# Patient Record
Sex: FEMALE | Race: WHITE | Employment: FULL TIME | URBAN - METROPOLITAN AREA
[De-identification: names, ages, dates, MRNs, and addresses within clinical notes are randomized per-mention and may not be internally consistent; named-entity substitution may affect disease eponyms.]

---

## 2017-08-11 ENCOUNTER — HOSPITAL ENCOUNTER (OUTPATIENT)
Dept: MAMMOGRAPHY | Age: 42
Discharge: HOME | End: 2017-08-11
Attending: OBSTETRICS & GYNECOLOGY
Payer: COMMERCIAL

## 2017-08-11 PROCEDURE — 77067 SCR MAMMO BI INCL CAD: CPT

## 2017-09-05 PROBLEM — I10 ESSENTIAL HYPERTENSION: Status: ACTIVE | Noted: 2017-09-05

## 2017-09-05 PROBLEM — K21.9 GASTROESOPHAGEAL REFLUX DISEASE WITHOUT ESOPHAGITIS: Status: ACTIVE | Noted: 2017-09-05

## 2017-09-05 PROBLEM — D50.9 IRON DEFICIENCY ANEMIA: Status: ACTIVE | Noted: 2017-09-05

## 2017-09-05 PROBLEM — E78.00 PURE HYPERCHOLESTEROLEMIA: Status: ACTIVE | Noted: 2017-09-05

## 2018-03-06 PROBLEM — E66.01 OBESITY, MORBID (HCC): Status: ACTIVE | Noted: 2018-03-06

## 2018-06-15 ENCOUNTER — HOSPITAL ENCOUNTER (EMERGENCY)
Age: 43
Discharge: HOME | End: 2018-06-15
Attending: EMERGENCY MEDICINE
Payer: COMMERCIAL

## 2018-06-15 ENCOUNTER — APPOINTMENT (OUTPATIENT)
Dept: GENERAL RADIOLOGY | Age: 43
End: 2018-06-15
Attending: EMERGENCY MEDICINE
Payer: COMMERCIAL

## 2018-06-15 VITALS
HEART RATE: 66 BPM | HEIGHT: 64 IN | BODY MASS INDEX: 45.24 KG/M2 | WEIGHT: 265 LBS | TEMPERATURE: 98.1 F | OXYGEN SATURATION: 98 % | RESPIRATION RATE: 18 BRPM | SYSTOLIC BLOOD PRESSURE: 145 MMHG | DIASTOLIC BLOOD PRESSURE: 92 MMHG

## 2018-06-15 DIAGNOSIS — M89.8X1 PERISCAPULAR PAIN: Primary | ICD-10-CM

## 2018-06-15 PROCEDURE — 96372 THER/PROPH/DIAG INJ SC/IM: CPT | Performed by: EMERGENCY MEDICINE

## 2018-06-15 PROCEDURE — 74011250637 HC RX REV CODE- 250/637: Performed by: EMERGENCY MEDICINE

## 2018-06-15 PROCEDURE — 74011250636 HC RX REV CODE- 250/636: Performed by: EMERGENCY MEDICINE

## 2018-06-15 PROCEDURE — 71046 X-RAY EXAM CHEST 2 VIEWS: CPT

## 2018-06-15 PROCEDURE — 99283 EMERGENCY DEPT VISIT LOW MDM: CPT | Performed by: EMERGENCY MEDICINE

## 2018-06-15 RX ORDER — KETOROLAC TROMETHAMINE 30 MG/ML
60 INJECTION, SOLUTION INTRAMUSCULAR; INTRAVENOUS
Status: COMPLETED | OUTPATIENT
Start: 2018-06-15 | End: 2018-06-15

## 2018-06-15 RX ORDER — HYDROCODONE BITARTRATE AND ACETAMINOPHEN 7.5; 325 MG/1; MG/1
1 TABLET ORAL
Status: COMPLETED | OUTPATIENT
Start: 2018-06-15 | End: 2018-06-15

## 2018-06-15 RX ORDER — METHOCARBAMOL 500 MG/1
500 TABLET, FILM COATED ORAL 3 TIMES DAILY
Qty: 21 TAB | Refills: 0 | Status: SHIPPED | OUTPATIENT
Start: 2018-06-15 | End: 2018-06-22

## 2018-06-15 RX ORDER — NAPROXEN 500 MG/1
500 TABLET ORAL 2 TIMES DAILY WITH MEALS
Qty: 20 TAB | Refills: 0 | Status: SHIPPED | OUTPATIENT
Start: 2018-06-15 | End: 2018-06-25

## 2018-06-15 RX ADMIN — KETOROLAC TROMETHAMINE 60 MG: 30 INJECTION, SOLUTION INTRAMUSCULAR at 09:33

## 2018-06-15 RX ADMIN — HYDROCODONE BITARTRATE AND ACETAMINOPHEN 1 TABLET: 7.5; 325 TABLET ORAL at 11:47

## 2018-06-15 NOTE — LETTER
[facility] [facility] EMERGENCY DEPT One [address] [address] [phone] Work/School Note Date: 6/15/2018 To Whom It May concern: 
 
Jeferson Jeaneths was seen and treated today in the emergency room by the following provider(s): 
Attending Provider: Linda Phillips MD.   
 
Jeferson Wilson Special Instructions:  work excuse today Sincerely, 
 
 
 
 
Linda Phillips MD

## 2018-06-15 NOTE — ED NOTES
I have reviewed discharge instructions with the patient. The patient verbalized understanding. Patient left ED via Discharge Method: ambulatory to Home with mother. Opportunity for questions and clarification provided. Patient given 2 scripts. To continue your aftercare when you leave the hospital, you may receive an automated call from our care team to check in on how you are doing. This is a free service and part of our promise to provide the best care and service to meet your aftercare needs.  If you have questions, or wish to unsubscribe from this service please call [phone]. Thank you for Choosing our [employer] Emergency Department.

## 2018-06-15 NOTE — ED PROVIDER NOTES
HPI Comments: Patient was fine yesterday, woke up this morning, had some pain to the left medial scapular region. She reached up with her right arm to get something out of a cabinet and the pain became much more acute. No history of recent injury or similar problem in the past.  Denies any shortness of breath. No pleuritic pain. No redness or rash to area of discomfort. Has taken no medications. The history is provided by the patient. Past Medical History:   Diagnosis Date    Asthma     use inhaler    GERD (gastroesophageal reflux disease)     Hypertension, essential        No past surgical history on file. Family History:   Problem Relation Age of Onset    Diabetes Mother      DM 2    Hypertension Mother     Depression Mother     Breast Cancer Neg Hx        Social History     Social History    Marital status:      Spouse name: N/A    Number of children: N/A    Years of education: N/A     Occupational History    Not on file. Social History Main Topics    Smoking status: Never Smoker    Smokeless tobacco: Never Used    Alcohol use No    Drug use: Not on file    Sexual activity: Not on file     Other Topics Concern    Not on file     Social History Narrative         ALLERGIES: Review of patient's allergies indicates no known allergies. Review of Systems   Constitutional: Negative for chills and fever. Respiratory: Negative. Negative for shortness of breath, wheezing and stridor. Genitourinary: Negative. Skin: Negative for color change and rash. Neurological: Negative. All other systems reviewed and are negative. Vitals:    06/15/18 0849   BP: (!) 153/97   Pulse: 81   Resp: 16   Temp: 98.1 °F (36.7 °C)   SpO2: 98%   Weight: 120.2 kg (265 lb)   Height: 5' 4\" (1.626 m)            Physical Exam   Constitutional: She appears well-developed and well-nourished. No distress. HENT:   Head: Atraumatic. Eyes: No scleral icterus. Neck: Neck supple. Cardiovascular: Normal rate, regular rhythm and intact distal pulses. No murmur heard. Pulmonary/Chest: Effort normal. No respiratory distress. She exhibits tenderness. Tender to area medial to the left scapula and somewhat less to similar area on the right. This makes pain. The patient original presentation worse to palpation   Abdominal: Soft. There is no tenderness. Musculoskeletal: Normal range of motion. Neurological: She is alert. Skin: Skin is warm and dry. Psychiatric: Thought content normal.   Nursing note and vitals reviewed. MDM  Number of Diagnoses or Management Options  Periscapular pain:   Diagnosis management comments: Here with chest pain worse certain movements and also to certain movements of each arm. No area of redness or rash. No initial provoking injury. Pain is actually significantly less on arrival than what patient had at home. No fever, cough or sputum. No history of pneumothorax. No unusual lifting or events that might be provoking of this. No history of DVT, pulmonary emboli or recent trips, travel or new risk factors       Amount and/or Complexity of Data Reviewed  Tests in the radiology section of CPT®: reviewed and ordered  Independent visualization of images, tracings, or specimens: yes    Risk of Complications, Morbidity, and/or Mortality  Presenting problems: moderate  Diagnostic procedures: low  Management options: moderate    Patient Progress  Patient progress: stable        ED Course       Procedures         Study Result      Chest 2 views dated 6/15/2018     CLINICAL INFORMATION: Left shoulder and back pain this morning     No comparison     Heart is normal in size. Mediastinum is unremarkable. Pulmonary vascularity  appears normal. Lungs clear. No pleural effusion or pneumothorax.  No acute bony  [place] seen on these 2 films.     IMPRESSION  IMPRESSION: No acute abnormality

## 2018-06-15 NOTE — DISCHARGE INSTRUCTIONS

## 2018-08-13 ENCOUNTER — HOSPITAL ENCOUNTER (OUTPATIENT)
Dept: MAMMOGRAPHY | Age: 43
Discharge: HOME | End: 2018-08-13
Attending: OBSTETRICS & GYNECOLOGY
Payer: COMMERCIAL

## 2018-08-13 DIAGNOSIS — Z12.39 SCREENING BREAST EXAMINATION: ICD-10-CM

## 2018-08-13 PROCEDURE — 77067 SCR MAMMO BI INCL CAD: CPT

## 2019-08-23 ENCOUNTER — HOSPITAL ENCOUNTER (OUTPATIENT)
Dept: MAMMOGRAPHY | Age: 44
Discharge: HOME | End: 2019-08-23
Attending: OBSTETRICS & GYNECOLOGY

## 2019-08-23 DIAGNOSIS — Z12.31 VISIT FOR SCREENING MAMMOGRAM: ICD-10-CM

## 2020-08-24 ENCOUNTER — HOSPITAL ENCOUNTER (OUTPATIENT)
Dept: MAMMOGRAPHY | Age: 45
Discharge: HOME | End: 2020-08-24
Attending: OBSTETRICS & GYNECOLOGY

## 2020-08-24 DIAGNOSIS — Z12.31 SCREENING MAMMOGRAM, ENCOUNTER FOR: ICD-10-CM

## 2021-05-19 PROBLEM — N90.4 LICHEN SCLEROSUS ET ATROPHICUS OF THE VULVA: Status: ACTIVE | Noted: 2019-03-14

## 2021-05-20 PROBLEM — E66.01 OBESITY, MORBID (HCC): Status: RESOLVED | Noted: 2018-03-06 | Resolved: 2021-05-20

## 2021-05-24 ENCOUNTER — HOSPITAL ENCOUNTER (OUTPATIENT)
Dept: ULTRASOUND IMAGING | Age: 46
Discharge: HOME | End: 2021-05-24
Attending: NURSE PRACTITIONER

## 2021-05-24 DIAGNOSIS — D17.1 LIPOMA OF ABDOMINAL WALL: ICD-10-CM

## 2021-07-29 ENCOUNTER — TRANSCRIBE ORDER (OUTPATIENT)
Dept: SCHEDULING | Age: 46
End: 2021-07-29

## 2021-07-29 DIAGNOSIS — Z12.31 VISIT FOR SCREENING MAMMOGRAM: Primary | ICD-10-CM

## 2021-08-26 ENCOUNTER — HOSPITAL ENCOUNTER (OUTPATIENT)
Dept: MAMMOGRAPHY | Age: 46
Discharge: HOME | End: 2021-08-26
Attending: NURSE PRACTITIONER

## 2021-08-26 DIAGNOSIS — Z12.31 VISIT FOR SCREENING MAMMOGRAM: ICD-10-CM

## 2022-02-08 ENCOUNTER — HOSPITAL ENCOUNTER (OUTPATIENT)
Dept: GENERAL RADIOLOGY | Age: 47
Discharge: HOME | End: 2022-02-08
Attending: NURSE PRACTITIONER
Payer: COMMERCIAL

## 2022-02-08 DIAGNOSIS — M25.562 CHRONIC PAIN OF LEFT KNEE: ICD-10-CM

## 2022-02-08 DIAGNOSIS — M25.561 CHRONIC PAIN OF RIGHT KNEE: ICD-10-CM

## 2022-02-08 DIAGNOSIS — G89.29 CHRONIC PAIN OF LEFT KNEE: ICD-10-CM

## 2022-02-08 DIAGNOSIS — G89.29 CHRONIC PAIN OF RIGHT KNEE: ICD-10-CM

## 2022-02-08 PROCEDURE — 73562 X-RAY EXAM OF KNEE 3: CPT

## 2022-02-17 ENCOUNTER — HOSPITAL ENCOUNTER (OUTPATIENT)
Dept: PHYSICAL THERAPY | Age: 47
Discharge: HOME | End: 2022-02-17
Payer: COMMERCIAL

## 2022-02-17 DIAGNOSIS — G89.29 CHRONIC PAIN OF RIGHT KNEE: ICD-10-CM

## 2022-02-17 DIAGNOSIS — M25.561 CHRONIC PAIN OF RIGHT KNEE: ICD-10-CM

## 2022-02-17 DIAGNOSIS — M25.562 CHRONIC PAIN OF LEFT KNEE: ICD-10-CM

## 2022-02-17 DIAGNOSIS — G89.29 CHRONIC PAIN OF LEFT KNEE: ICD-10-CM

## 2022-02-17 PROCEDURE — 97161 PT EVAL LOW COMPLEX 20 MIN: CPT

## 2022-02-17 NOTE — THERAPY EVALUATION
Jayce Bras  : 1975  Payor: Anna Bowser / Plan: [employer] / Product Type: PPO /    [address]  at [facility]  [address], [address], [place] [address]  [phone]   [phone]         OUTPATIENT PHYSICAL THERAPY:Initial Assessment 2022    ICD-10: Treatment Diagnosis:   Pain in left knee (M25.562)   Pain in right knee (M25.561)     Precautions/Allergies:   Patient has no known allergies. TREATMENT PLAN:  Effective Dates: 2022 TO 2022 (60 days). Frequency/Duration: 1-2 time a week for 60 Day(s) MEDICAL/REFERRING DIAGNOSIS:  Chronic pain of right knee [M25.561, G89.29]  Chronic pain of left knee [M25.562, G89.29]   DATE OF ONSET: Dec 2021  REFERRING PHYSICIAN: Tobias Sims NP MD Orders: PT eval and tx  Return MD Appointment: 3/2022     INITIAL ASSESSMENT:  Ms. Kana Khoury presents to PT eval w/ c/o B knee pain w/ L knee more painful than the R. She is unsure how it all began, but her X-rays were negative for fracture or degenerative changes. With meniscal and ligamentous testing, she had no reproduction of symptoms or laxity. Ms. Kana Khoury did however have gross weakness in BLEs and decreased balance in single leg stance. She will benefit from continued skilled PT services to improve her deficits listed below and to progress towards her PLOF. Thank you for this referral.    PROBLEM LIST (Impacting functional limitations):  1. Decreased Strength  2. Decreased ADL/Functional Activities  3. Decreased Ambulation Ability/Technique  4. Decreased Balance  5. Increased Pain  6. Decreased Activity Tolerance  7. Decreased [place] with Home Exercise Program INTERVENTIONS PLANNED:  1. Balance Exercise  2. Electrical Stimulation  3. Gait Training  4. Home Exercise Program (HEP)  5. Manual Therapy  6. Therapeutic Activites  7.  Therapeutic Exercise/Strengthening   GOALS: (Goals have been discussed and agreed upon with patient.)  Short-Term Functional Goals: Time Frame: 4 weeks  1. Pt will be independent w/ HEP in order to improve outcomes and decrease pain. 2. Pt will have LEFS score of 47 or greater displaying decreased functional impairments and pain levels. 3. Pt will perform >5 continuous minutes of standing dynamic balance w/o LOB or use of UE assist.   Discharge Goals: Time Frame: 5 weeks  1. Pt will have LEFS score of 52 or greater displaying decreased functional impairments and pain levels. 2. Pt will be able to ascend/descend 13 steps w/ a reciprocal gait pattern w/o LOB. 3. Pt will have 4+/5 or greater B LE strength in order to safely perform daily functional activities. 4. Pt will walk 10 minutes w/ pain of 1/10 or less in order to return to prior activities. Outcome Measure: Tool Used: Lower Extremity Functional Scale (LEFS)  Score:  Initial: 42/80 Most Recent: X/80 (Date: -- )   Interpretation of Score: 20 questions each scored on a 5 point scale with 0 representing \"extreme difficulty or unable to perform\" and 4 representing \"no difficulty\". The lower the score, the greater the functional disability. 80/80 represents no disability. Minimal detectable change is 9 points. Medical Necessity:   · Patient is expected to demonstrate progress in strength, range of motion, balance and coordination to increase independence with functional tasks. Reason for Services/Other Comments:  · Patient continues to demonstrate capacity to improve strength, ROM, balance, mobility which will increase independence. Total Duration: 30 min eval, 15 mins therex  PT Patient Time In/Time Out  Time In: 1030  Time Out: 1115    Rehabilitation Potential For Stated Goals: Good  Regarding Amy Trinh's therapy, I certify that the treatment plan above will be carried out by a therapist or under their direction.   Thank you for this referral,  Angella Bains, PT     Referring Physician Signature: Guerrero Ramos NP             The information in this section was collected on 2/17/22 (except where otherwise noted). HISTORY:   History of Present Injury/Illness (Reason for Referral): Unsure how pain began, stopped exercising before [place] because of her knee pain, lost 73 pounds over the past year on purpose, has trouble with stairs and getting up out of chair, L hurts worse than R  Past Medical History/Comorbidities:   Ms. Kana Khoury  has a past medical history of Asthma, GERD (gastroesophageal reflux disease), and Hypertension, essential.  Ms. Kana Khoury  has no past surgical history on file. anxiety,high cholesterol, seizures  Social History/Living Environment:     Lives w/ family in house w/ 2 steps  Prior Level of Function/Work/Activity:  Independent, works full time as   Dominant Side:         RIGHT      Ambulatory/Rehab Services H2 Model Falls Risk Assessment 2/17/22    Risk Factors:       No Risk Factors Identified Ability to Rise from Chair:       (0)  Ability to rise in a single movement    Falls Prevention Plan:       No modifications necessary   Total: (5 or greater = High Risk): 0    ©2010 [facility] [address] [place] [address] States Patent #[id number]. Federal Law prohibits the replication, distribution or use without written permission from [facility]     Current Medications:      Current Outpatient Medications:     meloxicam (MOBIC) 7.5 mg tablet, Take 1 Tablet by mouth daily as needed for Pain. With food, Disp: 30 Tablet, Rfl: 0    senna-docusate (PERICOLACE) 8.6-50 mg per tablet, Take 2 Tablets by mouth nightly. For constipation, Disp: 180 Tablet, Rfl: 3    Lactobacillus Acidoph & Bulgar (Floranex) 1 million cell tab tablet, Take 2 Tablets by mouth daily (after breakfast).  (probiotic supplement), Disp: 60 Tablet, Rfl: 2    SUMAtriptan (IMITREX) 50 mg tablet, Take 1/2 to 1 tablet orally once daily as needed for headache, Disp: 10 Tablet, Rfl: 1    busPIRone (BUSPAR) 5 mg tablet, TAKE 1 TABLET BY MOUTH TWICE A DAY Indications: repeated episodes of anxiety, Disp: 180 Tablet, Rfl: 3    metoprolol tartrate (LOPRESSOR) 50 mg tablet, TAKE 1 TABLET BY MOUTH EVERY DAY  Indications: high blood pressure, Disp: 90 Tablet, Rfl: 3    pantoprazole (PROTONIX) 40 mg tablet, TAKE 1 TABLET BY MOUTH EVERY DAY  Indications: gastroesophageal reflux disease, Disp: 90 Tablet, Rfl: 3    ProAir HFA 90 mcg/actuation inhaler, USE 2 PUFFS EVERY 4 TO 6 HOURS AS NEEDED FOR SHORTNESS OF BREATH/WHEEZING/COUGH  Indications: asthma attack, Disp: 1 Inhaler, Rfl: 11    atorvastatin (LIPITOR) 40 mg tablet, Take 1 Tablet by mouth daily. , Disp: 90 Tablet, Rfl: 3    clobetasoL (TEMOVATE) 0.05 % ointment, Use up to 2 times a day with a flair or at least 2 times a month to decrease inflammation, Disp: , Rfl:     Nortrel 7/7/7, 28, 0.5/0.75/1 mg- 35 mcg tab, TAKE 1 TABLET BY MOUTH EVERY DAY, Disp: 1 Package, Rfl: 11    cholecalciferol (VITAMIN D3) 1,000 unit cap, Take  by mouth daily. , Disp: , Rfl:     omega 3-dha-epa-fish oil (FISH OIL) 100-160-1,000 mg cap, Take  by mouth., Disp: , Rfl:    Date Last Reviewed:  2/17/2022    Number of Personal Factors/Comorbidities that affect the Plan of Care: 1-2: MODERATE COMPLEXITY   EXAMINATION:   Observation/Orthostatic Postural Assessment: Forward head, rounded shoulders  Palpation:          Tender to medial patella on L  Special Tests:           Kulwinder's - negative        MCL/LCL laxity - negative        ACL/PCL - negative   Functional Mobility:         Gait/Ambulation:  Independent, antalgic        Transfers:  Independent        Bed Mobility:  Independent        Stairs:  Step to w/ rails  Balance:          Decreased in SLS w/ L worse than R  Sensation:         Intact w/ light touch to BLEs    Joint/Muscle ROM Strength Updates   Hip flexion WFL 4-/5 B    Hip abduction WFL 4-/5 B    Hip extension WFL 4-/5 B    Knee flexion 145* B 4-/5 B    Knee extension 0* B 4-/5 B    DF WFL 5/5 B         Body Structures Involved:  8. Nerves  9. Bones  10. Joints  11. Muscles  12. Ligaments Body Functions Affected:  8. Mental  9. Sensory/Pain  10. Neuromusculoskeletal  11. Movement Related Activities and Participation Affected:  1. General Tasks and Demands  2. Mobility  3. Self Care  4. Domestic Life  5. Interpersonal Interactions and Relationships  6. Community, Social and [place]   Number of elements (examined above) that affect the Plan of Care: 3: MODERATE COMPLEXITY   CLINICAL PRESENTATION:   Presentation: Stable and uncomplicated: LOW COMPLEXITY   CLINICAL DECISION MAKING:      Use of outcome tool(s) and clinical judgement create a POC that gives a: Clear prediction of patient's progress: LOW COMPLEXITY              Pain: Initial:   Pain Intensity 1: 4  Pain Location 1: Knee  Pain Orientation 1: Left,Right  Post Session:  1/10     [employer] Mobile Portal  Access Code: [email or web address]  URL: [email or web address]  Date: 02/17/2022  Prepared by: Alcides Braden    Exercises  Long Sitting Quad Set - 7 x weekly - 2 sets - 10 reps - 5 hold  Supine Quad Set - 3 x weekly - 2 sets - 10 reps - 5 hold  Supine Active Straight Leg Raise - 3 x weekly - 2 sets - 10 reps - 5 hold  Supine Bridge - 3 x weekly - 2 sets - 10 reps - 5 hold  Clamshell with Resistance - 3 x weekly - 2 sets - 10 reps - 5 hold  Mini Squat with Counter Support - 3 x weekly - 2 sets - 10 reps - 5 hold    Variance from POC: none    Alcides Braden PT

## 2022-02-21 ENCOUNTER — HOSPITAL ENCOUNTER (OUTPATIENT)
Dept: PHYSICAL THERAPY | Age: 47
Discharge: HOME | End: 2022-02-21
Payer: COMMERCIAL

## 2022-02-21 PROCEDURE — 97110 THERAPEUTIC EXERCISES: CPT

## 2022-02-21 NOTE — PROGRESS NOTES
Ning Ridley  : 1975  Primary: [facility]  Secondary:  [facility]rapy Center at [facility]  [facility] [address], [place] [address]  [phone]   [phone]      OUTPATIENT PHYSICAL THERAPY: Daily Treatment Note 2022  Visit Count:  2    ICD-10: Treatment Diagnosis:   Pain in left knee (M25.562)   Pain in right knee (M25.561)     Precautions/Allergies:   Patient has no known allergies. TREATMENT PLAN:  Effective Dates: 2022 TO 2022 (60 days). Frequency/Duration: 1-2 time a week for 60 Day(s) MEDICAL/REFERRING DIAGNOSIS:  Chronic pain of right knee [M25.561, G89.29]  Chronic pain of left knee [M25.562, G89.29]   DATE OF ONSET: Dec 2021  REFERRING PHYSICIAN: Jorge Luis Michaels NP MD Orders: PT eval and tx  Return MD Appointment: 3/2022     Pre-treatment Symptoms/Complaints:  Pain in B knees is better. Taped helped. Squats made her sore but other exercises were fine. Pain: Initial:Pain Intensity 1: 1  Pain Location 1: Knee  Pain Orientation 1: Left,Right Post Session:  0/10   Medications Last Reviewed:  2022  Updated Objective Findings:  None Today  TREATMENT:     THERAPEUTIC EXERCISE: (50 minutes):  Exercises per grid below to improve mobility, strength, balance and coordination. Required minimal verbal and tactile cues to promote proper body alignment, promote proper body posture and promote proper body mechanics. Progressed resistance, range, repetitions and complexity of movement as indicated.    Date:  22 Date:  22 Date:      Activity/Exercise Parameters Parameters Parameters    NuStep  10 mins, 2.0      Quad set 10x B      SAQ 10x B on half foam 20x B 2#     SLR 10x B 2x10 B 2#     Bridge 10x 2x10 w/ lime band     Clamshell 10x B lime band 2x10 lime band B     LAQ  2x10 2# B     Hamstring curl  Standing 2x10 2# B     Squat 10x at counter      KT tape To L knee for stability To B knees for stability     Side step up  10x B 6 inch step     Step up  10x B 6 inch step     Heel tap  10x B 4 inch step     Leg press  2x10 50# BLE  2x10 25# SL       [employer] Portal  Access Code: [email or web address]  URL: [email or web address]  Date: 02/17/2022  Prepared by: Jack Penny    Exercises  Long Sitting Quad Set - 7 x weekly - 2 sets - 10 reps - 5 hold  Supine Quad Set - 3 x weekly - 2 sets - 10 reps - 5 hold  Supine Active Straight Leg Raise - 3 x weekly - 2 sets - 10 reps - 5 hold  Supine Bridge - 3 x weekly - 2 sets - 10 reps - 5 hold  Clamshell with Resistance - 3 x weekly - 2 sets - 10 reps - 5 hold  Mini Squat with Counter Support - 3 x weekly - 2 sets - 10 reps - 5 hold    Treatment/Session Summary:    · Response to Treatment: Pt had no issues with therex. Her pain decreased w/ exercises. · Communication/Consultation:  None today  · Equipment provided today:  None today  · Recommendations/Intent for next treatment session: Next visit will focus on progression of functional tasks as tolerated.     Total Treatment Billable Duration:  50 minutes therex  PT Patient Time In/Time Out  Time In: 5221  Time Out: 127 Kellee Nguyen PT    Future Appointments   Date Time Provider Jorge Almaguer   2/23/2022  1:45 PM Seema Flores, PT [facility]   3/1/2022  8:30 AM Seema Flores, PT [facility]   3/3/2022  8:15 AM [facility] Flores, [facility]   3/8/2022  8:15 AM Seema Flores, PT [facility]   3/8/2022 11:00 AM Darshana Fuller NP [facility] PST PST   3/10/2022  8:15 AM [facility]   3/15/2022  8:15 AM Seema Flores, PT [facility]   3/17/2022  8:15 AM Seema Flores, PT [facility]   3/22/2022  8:15 AM Seema Flores, PT [facility]   3/24/2022  8:15 AM Seema Flores, PT [facility]   6/28/2022  8:50 AM PST LAB [facility] PST PST   7/6/2022  9:00 AM WERNER Arango PST PST

## 2022-02-23 ENCOUNTER — HOSPITAL ENCOUNTER (OUTPATIENT)
Dept: PHYSICAL THERAPY | Age: 47
Discharge: HOME | End: 2022-02-23
Payer: COMMERCIAL

## 2022-02-23 PROCEDURE — 97110 THERAPEUTIC EXERCISES: CPT

## 2022-02-23 NOTE — PROGRESS NOTES
Eliu Le  : 1975  Primary: [facility] [employer] [facility]*  Secondary:  [facility]rapy Center at [facility]  [facility] [address], [place] [address]  [phone]   [phone]      OUTPATIENT PHYSICAL THERAPY: Daily Treatment Note 2022  Visit Count:  3    ICD-10: Treatment Diagnosis:   Pain in left knee (M25.562)   Pain in right knee (M25.561)     Precautions/Allergies:   Patient has no known allergies. TREATMENT PLAN:  Effective Dates: 2022 TO 2022 (60 days). Frequency/Duration: 1-2 time a week for 60 Day(s) MEDICAL/REFERRING DIAGNOSIS:  Chronic pain of right knee [M25.561, G89.29]  Chronic pain of left knee [M25.562, G89.29]   DATE OF ONSET: Dec 2021  REFERRING PHYSICIAN: Mathew Fry NP MD Orders: PT eval and tx  Return MD Appointment: 3/2022     Pre-treatment Symptoms/Complaints:  Pain in B knees is better. A little sore due to rain. Pain: Initial:Pain Intensity 1: 2  Pain Location 1: Knee  Pain Orientation 1: Left,Right Post Session:  0/10   Medications Last Reviewed:  2022  Updated Objective Findings:  None Today  TREATMENT:     THERAPEUTIC EXERCISE: (45 minutes):  Exercises per grid below to improve mobility, strength, balance and coordination. Required minimal verbal and tactile cues to promote proper body alignment, promote proper body posture and promote proper body mechanics. Progressed resistance, range, repetitions and complexity of movement as indicated.    Date:  22 Date:  22 Date:  22    Activity/Exercise Parameters Parameters Parameters    NuStep  10 mins, 2.0  10 mins, 3.0    Quad set 10x B      SAQ 10x B on half foam 20x B 2# 20x B 2#     SLR 10x B 2x10 B 2# 2x10 2# B     Bridge 10x 2x10 w/ lime band 2x10 w/ lime band    Clamshell 10x B lime band 2x10 lime band B 2x10 lime band B    LAQ  2x10 2# B 2x10 2# B    Hamstring curl  Standing 2x10 2# B 20x B seated BTB    Squat 10x at counter      KT tape To L knee for stability To B knees for stability     Side step up  10x B 6 inch step 10x B 6 inch step    Step up  10x B 6 inch step 10x B 6 inch step    Heel tap  10x B 4 inch step 10x B 4 inch step    Leg press  2x10 50# BLE  2x10 25# SL 2x10 50# BLE  2x10 25# SL    TKE   20x B on wall      [employer] Portal  Access Code: [email or web address]  URL: [email or web address]  Date: 02/17/2022  Prepared by: [facility]    Exercises  Long Sitting Quad Set - 7 x weekly - 2 sets - 10 reps - 5 hold  Supine Quad Set - 3 x weekly - 2 sets - 10 reps - 5 hold  Supine Active Straight Leg Raise - 3 x weekly - 2 sets - 10 reps - 5 hold  Supine Bridge - 3 x weekly - 2 sets - 10 reps - 5 hold  Clamshell with Resistance - 3 x weekly - 2 sets - 10 reps - 5 hold  Mini Squat with Counter Support - 3 x weekly - 2 sets - 10 reps - 5 hold    Treatment/Session Summary:    · Response to Treatment: Pt again had reduced pain w/ therex. Added TKE this session w/o issue. · Communication/Consultation:  None today  · Equipment provided today:  None today  · Recommendations/Intent for next treatment session: Next visit will focus on progression of functional tasks as tolerated.     Total Treatment Billable Duration:  45 minutes therex  PT Patient Time In/Time Out  Time In: 1345  Time Out: Jorge Goodwin PT    Future Appointments   Date Time Provider Jorge Almaguer   3/1/2022  8:30 AM Kolby Chavira, PT [facility]   3/3/2022  8:15 AM Kolby Chavira, PT [facility]   3/8/2022  8:15 AM Kolby Chavira, PT [facility]   3/8/2022 11:00 AM Marco Antonio Shepherd NP [facility] PST PST   3/10/2022  8:15 AM Kolby Chavira, PT [facility]   3/15/2022  8:15 AM Kolby Chavira, PT [facility]   3/17/2022  8:15 AM Kolby Chavira, PT [facility]   3/22/2022  8:15 AM Kolby Chavira, PT [facility]   3/24/2022  8:15 AM BJORN Hyatt [facility]   6/28/2022  8:50 AM PST LAB [facility] PST PST 7/6/2022  9:00 AM Ayala Selby NP [facility] PST PST

## 2022-03-01 ENCOUNTER — HOSPITAL ENCOUNTER (OUTPATIENT)
Dept: PHYSICAL THERAPY | Age: 47
Discharge: HOME | End: 2022-03-01
Payer: COMMERCIAL

## 2022-03-01 PROCEDURE — 97110 THERAPEUTIC EXERCISES: CPT

## 2022-03-01 NOTE — PROGRESS NOTES
Dolly Freire  : 1975  Primary: [facility]  Secondary:  Therapy Center at [facility]  [address] , [address], [place] [address]  [phone]   [phone]      OUTPATIENT PHYSICAL THERAPY: Daily Treatment Note 3/1/2022  Visit Count:  4    ICD-10: Treatment Diagnosis:   Pain in left knee (M25.562)   Pain in right knee (M25.561)     Precautions/Allergies:   Patient has no known allergies. TREATMENT PLAN:  Effective Dates: 2022 TO 2022 (60 days). Frequency/Duration: 1-2 time a week for 60 Day(s) MEDICAL/REFERRING DIAGNOSIS:  Chronic pain of right knee [M25.561, G89.29]  Chronic pain of left knee [M25.562, G89.29]   DATE OF ONSET: Dec 2021  REFERRING PHYSICIAN: Александр Alas NP MD Orders: PT eval and tx  Return MD Appointment: 3/2022     Pre-treatment Symptoms/Complaints:  Pain in B knees is better. Only had a few instances of discomfort over the weekend with getting out of a chair. Pain: Initial:Pain Intensity 1: 0  Pain Location 1: Knee  Pain Orientation 1: Left,Right Post Session:  0/10   Medications Last Reviewed:  3/1/2022  Updated Objective Findings:  None Today  TREATMENT:     THERAPEUTIC EXERCISE: (45 minutes):  Exercises per grid below to improve mobility, strength, balance and coordination. Required minimal verbal and tactile cues to promote proper body alignment, promote proper body posture and promote proper body mechanics. Progressed resistance, range, repetitions and complexity of movement as indicated.    Date:  22 Date:  22 Date:  22 Date:  3/1/22   Activity/Exercise Parameters Parameters Parameters Parameters   NuStep  10 mins, 2.0  10 mins, 3.0 10 mins, 3.0    Quad set 10x B      SAQ 10x B on half foam 20x B 2# 20x B 2#  20x B 2#   SLR 10x B 2x10 B 2# 2x10 2# B  2x10 2# B   Bridge 10x 2x10 w/ lime band 2x10 w/ lime band 2x10 w/ lime band   Clamshell 10x B lime band 2x10 lime band B 2x10 lime band B 2x10 lime band B   LAQ  2x10 2# B 2x10 2# B 20x 2# B   Hamstring curl  Standing 2x10 2# B 20x B seated BTB 20x standing 2# B   Squat 10x at counter      KT tape To L knee for stability To B knees for stability     Side step up  10x B 6 inch step 10x B 6 inch step 10x B 6 inch step (going on toe on back foot to work quad more)   Step up  10x B 6 inch step 10x B 6 inch step 10x B 6 inch step   Heel tap  10x B 4 inch step 10x B 4 inch step 10x B 4 inch step   Leg press  2x10 50# BLE  2x10 25# SL 2x10 50# BLE  2x10 25# SL 2x10 50# BL  2x10 25# SL   TKE   20x B on wall 20x B on wall     [employer] Portal  Access Code: [email or web address]  URL: [email or web address]  Date: 02/17/2022  Prepared by: Ting Chen    Exercises  Long Sitting Quad Set - 7 x weekly - 2 sets - 10 reps - 5 hold  Supine Quad Set - 3 x weekly - 2 sets - 10 reps - 5 hold  Supine Active Straight Leg Raise - 3 x weekly - 2 sets - 10 reps - 5 hold  Supine Bridge - 3 x weekly - 2 sets - 10 reps - 5 hold  Clamshell with Resistance - 3 x weekly - 2 sets - 10 reps - 5 hold  Mini Squat with Counter Support - 3 x weekly - 2 sets - 10 reps - 5 hold    Treatment/Session Summary:    · Response to Treatment: Pt was challenged w/ therex. No pain reported post session. · Communication/Consultation:  None today  · Equipment provided today:  None today  · Recommendations/Intent for next treatment session: Next visit will focus on progression of functional tasks as tolerated.     Total Treatment Billable Duration:  45 minutes therex  PT Patient Time In/Time Out  Time In: 0830  Time Out: 0915  Ting Chen PT    Future Appointments   Date Time Provider Jorge Almaguer   3/3/2022  8:15 AM Mohsen Oliver PT [facility]   3/8/2022  8:15 AM BJORN Lee [facility]   3/8/2022 11:00 AM WERNER Cisneros PST PST   [facility]10[facility]   [facility]15[facility]2022  8[facility] 3/17/2022  8:15 AM Seema Flores, PT [facility]   3/22/2022  8:15 AM Seema Flores, PT [facility]   3/24/2022  8:15 AM Seema Flores, PT [facility]   6/28/2022  8:50 AM PST [facility] PST PST   7/6/2022  9:00 AM WERNER Arango PST PST

## 2022-03-03 ENCOUNTER — HOSPITAL ENCOUNTER (OUTPATIENT)
Dept: PHYSICAL THERAPY | Age: 47
Discharge: HOME | End: 2022-03-03
Payer: COMMERCIAL

## 2022-03-03 PROCEDURE — 97110 THERAPEUTIC EXERCISES: CPT

## 2022-03-03 NOTE — PROGRESS NOTES
Rene Bell  : 1975  Primary: [facility]  Secondary:  [facility] at [facility]  [address] , [address], [place] [address]  [phone]   [phone]      OUTPATIENT PHYSICAL THERAPY: Daily Treatment Note 3/3/2022  Visit Count:  5    ICD-10: Treatment Diagnosis:   Pain in left knee (M25.562)   Pain in right knee (M25.561)     Precautions/Allergies:   Patient has no known allergies. TREATMENT PLAN:  Effective Dates: 2022 TO 2022 (60 days). Frequency/Duration: 1-2 time a week for 60 Day(s) MEDICAL/REFERRING DIAGNOSIS:  Chronic pain of right knee [M25.561, G89.29]  Chronic pain of left knee [M25.562, G89.29]   DATE OF ONSET: Dec 2021  REFERRING PHYSICIAN: Elisa Jacobs NP MD Orders: PT eval and tx  Return MD Appointment: 3/2022     Pre-treatment Symptoms/Complaints:  Pain in B knees is better. Hasn't had tape on for a few days and she has been fine. Follows up with MD next week. Pain: Initial:Pain Intensity 1: 0  Pain Location 1: Knee  Pain Orientation 1: Right,Left Post Session:  0/10   Medications Last Reviewed:  3/3/2022  Updated Objective Findings:  None Today  TREATMENT:     THERAPEUTIC EXERCISE: (45 minutes):  Exercises per grid below to improve mobility, strength, balance and coordination. Required minimal verbal and tactile cues to promote proper body alignment, promote proper body posture and promote proper body mechanics. Progressed resistance, range, repetitions and complexity of movement as indicated.    Date:  22 Date:  22 Date:  22 Date:  3/1/22 Date:  3/3/22   Activity/Exercise Parameters Parameters Parameters Parameters Parameters   NuStep  10 mins, 2.0  10 mins, 3.0 10 mins, 3.0  10 mins, 3.0   Quad set 10x B       SAQ 10x B on half foam 20x B 2# 20x B 2#  20x B 2# 20x B 2#   SLR 10x B 2x10 B 2# 2x10 2# B  2x10 2# B 2x10 2# B   Bridge 10x 2x10 w/ lime band 2x10 w/ lime band 2x10 w/ lime band 2x10 w/ lime band   Clamshell 10x B lime band 2x10 lime band B 2x10 lime band B 2x10 lime band B 2x10 lime band B   LAQ  2x10 2# B 2x10 2# B 20x 2# B 20x 2# B   Hamstring curl  Standing 2x10 2# B 20x B seated BTB 20x standing 2# B 20x B standing 2#   Squat 10x at counter       KT tape To L knee for stability To B knees for stability      Side step up  10x B 6 inch step 10x B 6 inch step 10x B 6 inch step (going on toe on back foot to work quad more) 10x B 6 inch step    Step up  10x B 6 inch step 10x B 6 inch step 10x B 6 inch step 10x B 6 inch step   Heel tap  10x B 4 inch step 10x B 4 inch step 10x B 4 inch step 10x B 4 inch step   Leg press  2x10 50# BLE  2x10 25# SL 2x10 50# BLE  2x10 25# SL 2x10 50# BL  2x10 25# SL 2x10 50# BL  2x10 25# SL   TKE   20x B on wall 20x B on wall      [employer] Portal  Access Code: [email or web address]  URL: [email or web address]  Date: 03/03/2022  Prepared by: Sb Pool    Exercises  Long Sitting Quad Set - 7 x weekly - 2 sets - 10 reps - 5 hold  Supine Quad Set - 3 x weekly - 2 sets - 10 reps - 5 hold  Supine Active Straight Leg Raise - 3 x weekly - 2 sets - 10 reps - 5 hold  Supine Bridge - 3 x weekly - 2 sets - 10 reps - 5 hold  Clamshell with Resistance - 3 x weekly - 2 sets - 10 reps - 5 hold  Mini Squat with Counter Support - 3 x weekly - 2 sets - 10 reps - 5 hold  Seated Long Arc Quad with Ankle Weight - 1 x daily - 3 x weekly - 2 sets - 10 reps - 5 hold  Standing Alternating Knee Flexion with Ankle Weights - 1 x daily - 3 x weekly - 2 sets - 10 reps - 5 hold  Lateral Step Up with Counter Support - 1 x daily - 3 x weekly - 1 sets - 10 reps - 5 hold  Forward Step Up with Counter Support - 1 x daily - 3 x weekly - 1 sets - 10 reps - 5 hold  Forward Step Down with Heel Tap and Counter Support - 1 x daily - 3 x weekly - 1 sets - 10 reps - 5 hold  Standing Terminal Knee Extension at Wall with Ball - 1 x daily - 3 x weekly - 2 sets - 10 reps - 5 hold      Treatment/Session Summary: · Response to Treatment: Pt was challenged w/ therex. See progress report. Patient issued new HEP and POC will be left open in case she needs to return to PT.   · Communication/Consultation:  None today  · Equipment provided today:  None today  · Recommendations/Intent for next treatment session: Next visit will focus on progression of functional tasks as tolerated.     Total Treatment Billable Duration:  45 minutes therex  PT Patient Time In/Time Out  Time In: 0815  Time Out: 0900  Red Gupta PT    Future Appointments   Date Time Provider Jorge Almaguer   3/8/2022  8:15 AM Marley Loveless, PT [facility]   3/8/2022 11:00 AM WERNER Cagle PST PST   3/10/2022  8:15 AM Marley Loveless, PT [facility]   3/15/2022  8:15 AM Marley Loveless, PT [facility]   3/17/2022  8:15 AM Marley Loveless, PT [facility]   3/22/2022  8:15 AM Marley Loveless, PT [facility]   3/24/2022  8:15 AM Marley Loveless, PT [facility]   6/28/2022  8:50 AM PST [facility] PST PST   7/6/2022  9:00 AM WERNER Cagle PST PST

## 2022-03-03 NOTE — PROGRESS NOTES
Yolie Layton  : 1975  Payor: Kat Menard / Plan: [facility] / Product Type: PPO /    [id number]  at [facility]  [address], [address], [place] [address]  [phone]   [phone]         OUTPATIENT PHYSICAL THERAPY:Progress Report 3/3/2022    ICD-10: Treatment Diagnosis:   Pain in left knee (M25.562)   Pain in right knee (M25.561)     Precautions/Allergies:   Patient has no known allergies. TREATMENT PLAN:  Effective Dates: 2022 TO 2022 (60 days). Frequency/Duration: 1-2 time a week for 60 Day(s) MEDICAL/REFERRING DIAGNOSIS:  Bilateral knee pain [M25.561, M25.562]   DATE OF ONSET: Dec 2021  REFERRING PHYSICIAN: Sarbjit Vang NP MD Orders: PT eval and tx  Return MD Appointment: 3/2022     INITIAL ASSESSMENT:  Ms. Hector Santos presents to PT eval w/ c/o B knee pain w/ L knee more painful than the R. She is unsure how it all began, but her X-rays were negative for fracture or degenerative changes. With meniscal and ligamentous testing, she had no reproduction of symptoms or laxity. Ms. Hector Santos did however have gross weakness in BLEs and decreased balance in single leg stance. She will benefit from continued skilled PT services to improve her deficits listed below and to progress towards her PLOF. Thank you for this referral.   Updated Assessment: Ms. Hector Santos presented to PT eval w/ c/o B knee pain w/ L knee more painful than the R. At this time, her pain is much improved and she is independent w/ her HEP. Ms. Hector Santos has met 6/7 PT goals and is progressing towards her final goal. We will leave POC open in case she needs to return, but plan is for patient to continue to perform her HEP at home. Thank you for this referral.    PROBLEM LIST (Impacting functional limitations):  1. Decreased Strength  2. Decreased ADL/Functional Activities  3. Decreased Ambulation Ability/Technique  4. Decreased Balance  5. Increased Pain  6.  Decreased Activity Tolerance  7. Decreased [place]al[place] with Home Exercise Program INTERVENTIONS PLANNED:  1. Balance Exercise  2. Electrical Stimulation  3. Gait Training  4. Home Exercise Program (HEP)  5. Manual Therapy  6. Therapeutic Activites  7. Therapeutic Exercise/Strengthening   GOALS: (Goals have been discussed and agreed upon with patient.)  Short-Term Functional Goals: Time Frame: 4 weeks  1. Pt will be independent w/ HEP in order to improve outcomes and decrease pain. MET  2. Pt will have LEFS score of 47 or greater displaying decreased functional impairments and pain levels. MET  3. Pt will perform >5 continuous minutes of standing dynamic balance w/o LOB or use of UE assist. MET  Discharge Goals: Time Frame: 5 weeks  1. Pt will have LEFS score of 52 or greater displaying decreased functional impairments and pain levels. MET  2. Pt will be able to ascend/descend 13 steps w/ a reciprocal gait pattern w/o LOB. MET  3. Pt will have 4+/5 or greater B LE strength in order to safely perform daily functional activities. Progressing  4. Pt will walk 10 minutes w/ pain of 1/10 or less in order to return to prior activities. MET    Outcome Measure: Tool Used: Lower Extremity Functional Scale (LEFS)  Score:  Initial: 42/80 Most Recent: 66/80 (Date: 3/3/22 )   Interpretation of Score: 20 questions each scored on a 5 point scale with 0 representing \"extreme difficulty or unable to perform\" and 4 representing \"no difficulty\". The lower the score, the greater the functional disability. 80/80 represents no disability. Minimal detectable change is 9 points. Medical Necessity:   · Patient is expected to demonstrate progress in strength, range of motion, balance and coordination to increase independence with functional tasks. Reason for Services/Other Comments:  · Patient continues to demonstrate capacity to improve strength, ROM, balance, mobility which will increase independence.     PT Patient Time In/Time Out  Time In: 0815  Time Out: 0900    Rehabilitation Potential For Stated Goals: Good  Regarding Amy Trinh's therapy, I certify that the treatment plan above will be carried out by a therapist or under their direction. Thank you for this referral,  Ester Donnelly, PT     Referring Physician Signature: Imtiaz Khoury NP             The information in this section was collected on 2/17/22 (except where otherwise noted). HISTORY:   History of Present Injury/Illness (Reason for Referral): Unsure how pain began, stopped exercising before Christmas because of her knee pain, lost 73 pounds over the past year on purpose, has trouble with stairs and getting up out of chair, L hurts worse than R  Past Medical History/Comorbidities:   Ms. Elzbieta Ohara  has a past medical history of Asthma, GERD (gastroesophageal reflux disease), and Hypertension, essential.  Ms. Elzbieta Ohara  has no past surgical history on file. anxiety,high cholesterol, seizures  Social History/Living Environment:     Lives w/ family in house w/ 2 steps  Prior Level of Function/Work/Activity:  Independent, works full time as   Dominant Side:         RIGHT      Ambulatory/Rehab Services H2 Model Falls Risk Assessment 2/17/22    Risk Factors:       No Risk Factors Identified Ability to Rise from Chair:       (0)  Ability to rise in a single movement    Falls Prevention Plan:       No modifications necessary   Total: (5 or greater = High Risk): 0    ©2010 [facility] [address] [place] [address] States Patent #[id number]. Federal Law prohibits the replication, distribution or use without written permission from [facility] [employer]     Current Medications:      Current Outpatient Medications:     meloxicam (MOBIC) 7.5 mg tablet, Take 1 Tablet by mouth daily as needed for Pain. With food, Disp: 30 Tablet, Rfl: 0    senna-docusate (PERICOLACE) 8.6-50 mg per tablet, Take 2 Tablets by mouth nightly.  For constipation, Disp: 180 Tablet, Rfl: 3    Lactobacillus Acidoph & Ayla (Floranex) 1 million cell tab tablet, Take 2 Tablets by mouth daily (after breakfast). (probiotic supplement), Disp: 60 Tablet, Rfl: 2    SUMAtriptan (IMITREX) 50 mg tablet, Take 1/2 to 1 tablet orally once daily as needed for headache, Disp: 10 Tablet, Rfl: 1    busPIRone (BUSPAR) 5 mg tablet, TAKE 1 TABLET BY MOUTH TWICE A DAY  Indications: repeated episodes of anxiety, Disp: 180 Tablet, Rfl: 3    metoprolol tartrate (LOPRESSOR) 50 mg tablet, TAKE 1 TABLET BY MOUTH EVERY DAY  Indications: high blood pressure, Disp: 90 Tablet, Rfl: 3    pantoprazole (PROTONIX) 40 mg tablet, TAKE 1 TABLET BY MOUTH EVERY DAY  Indications: gastroesophageal reflux disease, Disp: 90 Tablet, Rfl: 3    ProAir HFA 90 mcg/actuation inhaler, USE 2 PUFFS EVERY 4 TO 6 HOURS AS NEEDED FOR SHORTNESS OF BREATH/WHEEZING/COUGH  Indications: asthma attack, Disp: 1 Inhaler, Rfl: 11    atorvastatin (LIPITOR) 40 mg tablet, Take 1 Tablet by mouth daily. , Disp: 90 Tablet, Rfl: 3    clobetasoL (TEMOVATE) 0.05 % ointment, Use up to 2 times a day with a flair or at least 2 times a month to decrease inflammation, Disp: , Rfl:     Nortrel 7/7/7, 28, 0.5/0.75/1 mg- 35 mcg tab, TAKE 1 TABLET BY MOUTH EVERY DAY, Disp: 1 Package, Rfl: 11    cholecalciferol (VITAMIN D3) 1,000 unit cap, Take  by mouth daily. , Disp: , Rfl:     omega 3-dha-epa-fish oil (FISH OIL) 100-160-1,000 mg cap, Take  by mouth., Disp: , Rfl:    Date Last Reviewed:  3/3/2022    EXAMINATION:   Observation/Orthostatic Postural Assessment: Forward head, rounded shoulders  Palpation:          Tender to medial patella on L  Special Tests:           Kulwinder's - negative        MCL/LCL laxity - negative        ACL/PCL - negative   Functional Mobility:         Gait/Ambulation:  Independent, antalgic        Transfers:  Independent        Bed Mobility:  Independent        Stairs:  Step to w/ rails  Balance:          Decreased in SLS w/ L worse than R  Sensation:         Intact w/ light touch to BLEs    Joint/Muscle ROM Strength Updates   Hip flexion WFL 4-/5 B 4+/5 B   Hip abduction WFL 4-/5 B 4/5 B   Hip extension WFL 4-/5 B 4+/5 B   Knee flexion 145* B 4-/5 B 4+/5    Knee extension 0* B 4-/5 B 5/5    DF WFL 5/5 B         Body Structures Involved:  8. Nerves  9. Bones  10. Joints  11. Muscles  12. Ligaments Body Functions Affected:  8. Mental  9. Sensory/Pain  10. Neuromusculoskeletal  11. Movement Related Activities and Participation Affected:  1. General Tasks and Demands  2. Mobility  3. Self Care  4. Domestic Life  5. Interpersonal Interactions and Relationships  6. Community, Social and Civic Life   CLINICAL PRESENTATION:   CLINICAL DECISION MAKING:                 Pain: Initial:   Pain Intensity 1: 0  Pain Location 1: Knee  Pain Orientation 1: Right,Left  Post Session:  0/10     [employer] Portal  Access Code: [email or web address]  URL: [email or web address]  Date: 03/03/2022  Prepared by: [employer]    Exercises  Long Sitting Quad Set - 7 x weekly - 2 sets - 10 reps - 5 hold  Supine Quad Set - 3 x weekly - 2 sets - 10 reps - 5 hold  Supine Active Straight Leg Raise - 3 x weekly - 2 sets - 10 reps - 5 hold  Supine Bridge - 3 x weekly - 2 sets - 10 reps - 5 hold  Clamshell with Resistance - 3 x weekly - 2 sets - 10 reps - 5 hold  Mini Squat with Counter Support - 3 x weekly - 2 sets - 10 reps - 5 hold  Seated Long Arc Quad with Ankle Weight - 1 x daily - 3 x weekly - 2 sets - 10 reps - 5 hold  Standing Alternating Knee Flexion with Ankle Weights - 1 x daily - 3 x weekly - 2 sets - 10 reps - 5 hold  Lateral Step Up with Counter Support - 1 x daily - 3 x weekly - 1 sets - 10 reps - 5 hold  Forward Step Up with Counter Support - 1 x daily - 3 x weekly - 1 sets - 10 reps - 5 hold  Forward Step Down with Heel Tap and Counter Support - 1 x daily - 3 x weekly - 1 sets - 10 reps - 5 hold  Standing Terminal Knee Extension at Wall with Ball - 1 x daily - 3 x weekly - 2 sets - 10 reps - 5 hold    Variance from POC: none    Rachid Gamez, PT

## 2022-03-08 ENCOUNTER — APPOINTMENT (OUTPATIENT)
Dept: PHYSICAL THERAPY | Age: 47
End: 2022-03-08
Payer: COMMERCIAL

## 2022-03-10 ENCOUNTER — APPOINTMENT (OUTPATIENT)
Dept: PHYSICAL THERAPY | Age: 47
End: 2022-03-10
Payer: COMMERCIAL

## 2022-03-15 ENCOUNTER — APPOINTMENT (OUTPATIENT)
Dept: PHYSICAL THERAPY | Age: 47
End: 2022-03-15
Payer: COMMERCIAL

## 2022-03-17 ENCOUNTER — APPOINTMENT (OUTPATIENT)
Dept: PHYSICAL THERAPY | Age: 47
End: 2022-03-17
Payer: COMMERCIAL

## 2022-03-18 PROBLEM — N90.4 LICHEN SCLEROSUS ET ATROPHICUS OF THE VULVA: Status: ACTIVE | Noted: 2019-03-14

## 2022-03-18 PROBLEM — I10 ESSENTIAL HYPERTENSION: Status: ACTIVE | Noted: 2017-09-05

## 2022-03-19 PROBLEM — K21.9 GASTROESOPHAGEAL REFLUX DISEASE WITHOUT ESOPHAGITIS: Status: ACTIVE | Noted: 2017-09-05

## 2022-03-19 PROBLEM — D50.9 IRON DEFICIENCY ANEMIA: Status: ACTIVE | Noted: 2017-09-05

## 2022-03-19 PROBLEM — E78.00 PURE HYPERCHOLESTEROLEMIA: Status: ACTIVE | Noted: 2017-09-05

## 2022-03-22 ENCOUNTER — APPOINTMENT (OUTPATIENT)
Dept: PHYSICAL THERAPY | Age: 47
End: 2022-03-22
Payer: COMMERCIAL

## 2022-03-24 ENCOUNTER — APPOINTMENT (OUTPATIENT)
Dept: PHYSICAL THERAPY | Age: 47
End: 2022-03-24
Payer: COMMERCIAL

## 2022-04-26 NOTE — PROGRESS NOTES
Sanjuana Leyva  : 1975  Payor: BLUE CROSS / Plan[place] [facility] / Product Type: PPO /    [address]  at [address]  [address], [address], [place]  [phone]   [phone]         OUTPATIENT PHYSICAL [facility] 2022    ICD-10: Treatment Diagnosis:   Pain in left knee (M25.562)   Pain in right knee (M25.561)     Precautions/Allergies:   Patient has no known allergies. TREATMENT PLAN:  Effective Dates: 2022 TO 2022 (60 days). Frequency/Duration: 1-2 time a week for 60 Day(s) MEDICAL/REFERRING DIAGNOSIS:  Bilateral knee pain [M25.561, M25.562]   DATE OF ONSET: Dec 2021  REFERRING PHYSICIAN: Kaiden Copeland NP MD Orders: PT eval and tx  Return MD Appointment: 3/2022     INITIAL ASSESSMENT:  Ms. Alexis Sahni presents to PT eval w/ c/o B knee pain w/ L knee more painful than the R. She is unsure how it all began, but her X-rays were negative for fracture or degenerative changes. With meniscal and ligamentous testing, she had no reproduction of symptoms or laxity. Ms. Alexis Sahni did however have gross weakness in BLEs and decreased balance in single leg stance. She will benefit from continued skilled PT services to improve her deficits listed below and to progress towards her PLOF. Thank you for this referral.   Updated Assessment: Ms. Alexis Sahni presented to PT eval w/ c/o B knee pain w/ L knee more painful than the R. At this time, her pain is much improved and she is independent w/ her HEP. Ms. Alexis Sahni has met 6/7 PT goals and is progressing towards her final goal. We will leave POC open in case she needs to return, but plan is for patient to continue to perform her HEP at home. Thank you for this referral.   Discharge Summary: Ms. Alexis Sahni presented to PT eval w/ c/o B knee pain w/ L knee more painful than R. With PT, she improved her pain greatly and had met 6/7 PT goals as of her last visit.  On her last visit, the plan was to leave her POC open and have her completed her HEP to see how she did managing her pain and symptom at home. At this time, she has not needed to return and her POC has . DC from outpatient PT at this time. Thank you for this referral.      GOALS: (Goals have been discussed and agreed upon with patient.)  Short-Term Functional Goals: Time Frame: 4 weeks  1. Pt will be independent w/ HEP in order to improve outcomes and decrease pain. MET  2. Pt will have LEFS score of 47 or greater displaying decreased functional impairments and pain levels. MET  3. Pt will perform >5 continuous minutes of standing dynamic balance w/o LOB or use of UE assist. MET  Discharge Goals: Time Frame: 5 weeks  1. Pt will have LEFS score of 52 or greater displaying decreased functional impairments and pain levels. MET  2. Pt will be able to ascend/descend 13 steps w/ a reciprocal gait pattern w/o LOB. MET  3. Pt will have 4+/5 or greater B LE strength in order to safely perform daily functional activities. Not met as of last visit  4. Pt will walk 10 minutes w/ pain of 1/10 or less in order to return to prior activities. MET    Outcome Measure: Tool Used: Lower Extremity Functional Scale (LEFS)  Score:  Initial: 42/80 Most Recent: 6680 (Date: 3/3/22 )   Interpretation of Score: 20 questions each scored on a 5 point scale with 0 representing \"extreme difficulty or unable to perform\" and 4 representing \"no difficulty\". The lower the score, the greater the functional disability. 80/80 represents no disability. Minimal detectable change is 9 points. Rehabilitation Potential For Stated Goals: Good  Regarding Amy Trinh's therapy, I certify that the treatment plan above will be carried out by a therapist or under their direction. Thank you for this referral,  Maurice Posey PT     Referring Physician Signature: Elsy Suarez NP             The information in this section was collected on 22 (except where otherwise noted).   HISTORY: History of Present Injury/Illness (Reason for Referral): Unsure how pain began, stopped exercising before Nohemi because of her knee pain, lost 73 pounds over the past year on purpose, has trouble with stairs and getting up out of chair, L hurts worse than R  Past Medical History/Comorbidities:   Ms. Marcella Retana  has a past medical history of Asthma, GERD (gastroesophageal reflux disease), and Hypertension, essential.  Ms. Marcella Retana  has no past surgical history on file. anxiety,high cholesterol, seizures  Social History/Living Environment:     Lives w/ family in house w/ 2 steps  Prior Level of Function/Work/Activity:  Independent, works full time as   Dominant Side:         RIGHT      Ambulatory/Rehab Services H2 Model Falls Risk Assessment 2/17/22    Risk Factors:       No Risk Factors Identified Ability to Rise from Chair:       (0)  Ability to rise in a single movement    Falls Prevention Plan:       No modifications necessary   Total: (5 or greater = High Risk): 0    ©2010 [facility] [address] [place] Patent #[id number]. Federal Law prohibits the replication, distribution or use without written permission from [facility] [employer]     Current Medications:      Current Outpatient Medications:     busPIRone (BUSPAR) 5 mg tablet, TAKE 1 TABLET BY MOUTH TWICE A DAY  Indications: repeated episodes of anxiety, Disp: 180 Tablet, Rfl: 3    pantoprazole (PROTONIX) 40 mg tablet, TAKE 1 TABLET BY MOUTH EVERY DAY  Indications: gastroesophageal reflux disease, Disp: 90 Tablet, Rfl: 3    ProAir HFA 90 mcg/actuation inhaler, USE 2 PUFFS EVERY 4 TO 6 HOURS AS NEEDED FOR SHORTNESS OF BREATH/WHEEZING/COUGH  Indications: asthma attack, Disp: 1 Each, Rfl: 11    SUMAtriptan (IMITREX) 50 mg tablet, Take 1/2 to 1 tablet orally once daily as needed for headache, Disp: 10 Tablet, Rfl: 5    senna-docusate (PERICOLACE) 8.6-50 mg per tablet, Take 2 Tablets by mouth nightly.  For constipation, Disp: 180 Tablet, Rfl: 3    atorvastatin (LIPITOR) 40 mg tablet, Take 1 Tablet by mouth daily. , Disp: 90 Tablet, Rfl: 3    meloxicam (MOBIC) 7.5 mg tablet, Take 1 Tablet by mouth daily as needed for Pain. With food, Disp: 30 Tablet, Rfl: 0    metoprolol tartrate (LOPRESSOR) 50 mg tablet, TAKE 1 TABLET BY MOUTH EVERY DAY  Indications: high blood pressure, Disp: 90 Tablet, Rfl: 3    clobetasoL (TEMOVATE) 0.05 % ointment, Use up to 2 times a day with a flair or at least 2 times a month to decrease inflammation, Disp: , Rfl:     Nortrel 7/7/7, 28, 0.5/0.75/1 mg- 35 mcg tab, TAKE 1 TABLET BY MOUTH EVERY DAY, Disp: 1 Package, Rfl: 11    cholecalciferol (VITAMIN D3) 1,000 unit cap, Take  by mouth daily. , Disp: , Rfl:     omega 3-dha-epa-fish oil (FISH OIL) 100-160-1,000 mg cap, Take  by mouth., Disp: , Rfl:    Date Last Reviewed:  4/26/2022    EXAMINATION:   Observation/Orthostatic Postural Assessment: Forward head, rounded shoulders  Palpation:          Tender to medial patella on L  Special Tests: Kulwinder's - negative        MCL/LCL laxity - negative        ACL/PCL - negative   Functional Mobility:         Gait/Ambulation:  Independent, antalgic        Transfers:  Independent        Bed Mobility:  Independent        Stairs:  Step to w/ rails  Balance:          Decreased in SLS w/ L worse than R  Sensation:         Intact w/ light touch to BLEs    Joint/Muscle ROM Strength Updates   Hip flexion WFL 4-/5 B 4+/5 B   Hip abduction WFL 4-/5 B 4/5 B   Hip extension WFL 4-/5 B 4+/5 B   Knee flexion 145* B 4-/5 B 4+/5    Knee extension 0* B 4-/5 B 5/5    DF WFL 5/5 B         Body Structures Involved:  1. Nerves  2. Bones  3. Joints  4. Muscles  5. Ligaments Body Functions Affected:  1. Mental  2. Sensory/Pain  3. Neuromusculoskeletal  4. Movement Related Activities and Participation Affected:  1. General Tasks and Demands  2. Mobility  3. Self Care  4. Domestic Life  5.  Interpersonal Interactions and Relationships  6. Community, Social and Civic Life   CLINICAL PRESENTATION:   CLINICAL DECISION MAKING:                 Pain: Initial:   Pain Intensity 1: 0  Pain Location 1: Knee  Pain Orientation 1: Right,Left  Post Session:  0/10     [email or web address] Portal  Access Code: [email or web address]  URL: [email or web address]  Date: 03/03/2022  Prepared by: [facility]    Exercises  Long Sitting Quad Set - 7 x weekly - 2 sets - 10 reps - 5 hold  Supine Quad Set - 3 x weekly - 2 sets - 10 reps - 5 hold  Supine Active Straight Leg Raise - 3 x weekly - 2 sets - 10 reps - 5 hold  Supine Bridge - 3 x weekly - 2 sets - 10 reps - 5 hold  Clamshell with Resistance - 3 x weekly - 2 sets - 10 reps - 5 hold  Mini Squat with Counter Support - 3 x weekly - 2 sets - 10 reps - 5 hold  Seated Long Arc Quad with Ankle Weight - 1 x daily - 3 x weekly - 2 sets - 10 reps - 5 hold  Standing Alternating Knee Flexion with Ankle Weights - 1 x daily - 3 x weekly - 2 sets - 10 reps - 5 hold  Lateral Step Up with Counter Support - 1 x daily - 3 x weekly - 1 sets - 10 reps - 5 hold  Forward Step Up with Counter Support - 1 x daily - 3 x weekly - 1 sets - 10 reps - 5 hold  Forward Step Down with Heel Tap and Counter Support - 1 x daily - 3 x weekly - 1 sets - 10 reps - 5 hold  Standing Terminal Knee Extension at Wall with Ball - 1 x daily - 3 x weekly - 2 sets - 10 reps - 5 hold    Variance from POC: none    [facility], PT

## 2022-06-27 DIAGNOSIS — Z00.00 LABORATORY EXAMINATION ORDERED AS PART OF A COMPLETE PHYSICAL EXAMINATION: ICD-10-CM

## 2022-06-27 DIAGNOSIS — E55.9 VITAMIN D DEFICIENCY: Primary | ICD-10-CM

## 2022-06-28 DIAGNOSIS — E55.9 VITAMIN D DEFICIENCY: ICD-10-CM

## 2022-06-28 DIAGNOSIS — Z00.00 LABORATORY EXAMINATION ORDERED AS PART OF A COMPLETE PHYSICAL EXAMINATION: ICD-10-CM

## 2022-06-28 LAB
25(OH)D3 SERPL-MCNC: 60 NG/ML (ref 30–100)
ALBUMIN SERPL-MCNC: 3.4 G/DL (ref 3.5–5)
ALBUMIN/GLOB SERPL: 1 {RATIO} (ref 1.2–3.5)
ALP SERPL-CCNC: 66 U/L (ref 50–136)
ALT SERPL-CCNC: 27 U/L (ref 12–65)
ANION GAP SERPL CALC-SCNC: 7 MMOL/L (ref 7–16)
AST SERPL-CCNC: 25 U/L (ref 15–37)
BASOPHILS # BLD: 0.1 K/UL (ref 0–0.2)
BASOPHILS NFR BLD: 1 % (ref 0–2)
BILIRUB SERPL-MCNC: 0.6 MG/DL (ref 0.2–1.1)
BUN SERPL-MCNC: 17 MG/DL (ref 6–23)
CALCIUM SERPL-MCNC: 9.1 MG/DL (ref 8.3–10.4)
CHLORIDE SERPL-SCNC: 105 MMOL/L (ref 98–107)
CHOLEST SERPL-MCNC: 194 MG/DL
CO2 SERPL-SCNC: 26 MMOL/L (ref 21–32)
CREAT SERPL-MCNC: 0.9 MG/DL (ref 0.6–1)
DIFFERENTIAL METHOD BLD: NORMAL
EOSINOPHIL # BLD: 0.5 K/UL (ref 0–0.8)
EOSINOPHIL NFR BLD: 5 % (ref 0.5–7.8)
ERYTHROCYTE [DISTWIDTH] IN BLOOD BY AUTOMATED COUNT: 12.2 % (ref 11.9–14.6)
GLOBULIN SER CALC-MCNC: 3.4 G/DL (ref 2.3–3.5)
GLUCOSE SERPL-MCNC: 78 MG/DL (ref 65–100)
HCT VFR BLD AUTO: 42.1 % (ref 35.8–46.3)
HDLC SERPL-MCNC: 78 MG/DL (ref 40–60)
HDLC SERPL: 2.5 {RATIO}
HGB BLD-MCNC: 14.1 G/DL (ref 11.7–15.4)
IMM GRANULOCYTES # BLD AUTO: 0 K/UL (ref 0–0.5)
IMM GRANULOCYTES NFR BLD AUTO: 0 % (ref 0–5)
LDLC SERPL CALC-MCNC: 102 MG/DL
LYMPHOCYTES # BLD: 2.8 K/UL (ref 0.5–4.6)
LYMPHOCYTES NFR BLD: 32 % (ref 13–44)
MCH RBC QN AUTO: 31.3 PG (ref 26.1–32.9)
MCHC RBC AUTO-ENTMCNC: 33.5 G/DL (ref 31.4–35)
MCV RBC AUTO: 93.3 FL (ref 79.6–97.8)
MONOCYTES # BLD: 0.5 K/UL (ref 0.1–1.3)
MONOCYTES NFR BLD: 6 % (ref 4–12)
NEUTS SEG # BLD: 5 K/UL (ref 1.7–8.2)
NEUTS SEG NFR BLD: 56 % (ref 43–78)
NRBC # BLD: 0 K/UL (ref 0–0.2)
PLATELET # BLD AUTO: 343 K/UL (ref 150–450)
PMV BLD AUTO: 10.2 FL (ref 9.4–12.3)
POTASSIUM SERPL-SCNC: 4.3 MMOL/L (ref 3.5–5.1)
PROT SERPL-MCNC: 6.8 G/DL (ref 6.3–8.2)
RBC # BLD AUTO: 4.51 M/UL (ref 4.05–5.2)
SODIUM SERPL-SCNC: 138 MMOL/L (ref 136–145)
TRIGL SERPL-MCNC: 70 MG/DL (ref 35–150)
TSH, 3RD GENERATION: 0.92 UIU/ML (ref 0.36–3.74)
VLDLC SERPL CALC-MCNC: 14 MG/DL (ref 6–23)
WBC # BLD AUTO: 8.9 K/UL (ref 4.3–11.1)

## 2022-07-05 NOTE — PATIENT INSTRUCTIONS
Here is a list of your current Health Maintenance items (your personalized list of preventive services) with a due date:    Health Maintenance Due   Topic Date Due    COVID-19 Vaccine (1) Never done    HIV screen  Never done    Hepatitis C screen  Never done    DTaP/Tdap/Td vaccine (1 - Tdap) Never done    Cervical cancer screen  Never done

## 2022-07-06 ENCOUNTER — OFFICE VISIT (OUTPATIENT)
Dept: FAMILY MEDICINE CLINIC | Facility: CLINIC | Age: 47
End: 2022-07-06
Payer: COMMERCIAL

## 2022-07-06 VITALS
WEIGHT: 188 LBS | HEIGHT: 64 IN | DIASTOLIC BLOOD PRESSURE: 82 MMHG | OXYGEN SATURATION: 99 % | HEART RATE: 84 BPM | BODY MASS INDEX: 32.1 KG/M2 | SYSTOLIC BLOOD PRESSURE: 120 MMHG

## 2022-07-06 DIAGNOSIS — E78.00 PURE HYPERCHOLESTEROLEMIA: ICD-10-CM

## 2022-07-06 DIAGNOSIS — D50.9 IRON DEFICIENCY ANEMIA, UNSPECIFIED IRON DEFICIENCY ANEMIA TYPE: ICD-10-CM

## 2022-07-06 DIAGNOSIS — E55.9 VITAMIN D DEFICIENCY: ICD-10-CM

## 2022-07-06 DIAGNOSIS — G89.29 CHRONIC MIDLINE LOW BACK PAIN WITHOUT SCIATICA: ICD-10-CM

## 2022-07-06 DIAGNOSIS — M54.50 CHRONIC MIDLINE LOW BACK PAIN WITHOUT SCIATICA: ICD-10-CM

## 2022-07-06 DIAGNOSIS — Z12.11 COLON CANCER SCREENING: ICD-10-CM

## 2022-07-06 DIAGNOSIS — I10 ESSENTIAL HYPERTENSION: ICD-10-CM

## 2022-07-06 DIAGNOSIS — Z00.00 ROUTINE GENERAL MEDICAL EXAMINATION AT A HEALTH CARE FACILITY: Primary | ICD-10-CM

## 2022-07-06 DIAGNOSIS — K21.9 GASTROESOPHAGEAL REFLUX DISEASE WITHOUT ESOPHAGITIS: ICD-10-CM

## 2022-07-06 PROCEDURE — 99396 PREV VISIT EST AGE 40-64: CPT | Performed by: NURSE PRACTITIONER

## 2022-07-06 RX ORDER — TIZANIDINE 4 MG/1
TABLET ORAL
Qty: 30 TABLET | Refills: 0 | Status: SHIPPED | OUTPATIENT
Start: 2022-07-06 | End: 2022-08-02

## 2022-07-06 SDOH — ECONOMIC STABILITY: HOUSING INSECURITY: IN THE LAST 12 MONTHS, HOW MANY PLACES HAVE YOU LIVED?: 1

## 2022-07-06 SDOH — HEALTH STABILITY: PHYSICAL HEALTH: ON AVERAGE, HOW MANY MINUTES DO YOU ENGAGE IN EXERCISE AT THIS LEVEL?: 20 MIN

## 2022-07-06 SDOH — ECONOMIC STABILITY: FOOD INSECURITY: WITHIN THE PAST 12 MONTHS, YOU WORRIED THAT YOUR FOOD WOULD RUN OUT BEFORE YOU GOT MONEY TO BUY MORE.: SOMETIMES TRUE

## 2022-07-06 SDOH — ECONOMIC STABILITY: INCOME INSECURITY: IN THE LAST 12 MONTHS, WAS THERE A TIME WHEN YOU WERE NOT ABLE TO PAY THE MORTGAGE OR RENT ON TIME?: YES

## 2022-07-06 SDOH — ECONOMIC STABILITY: FOOD INSECURITY: WITHIN THE PAST 12 MONTHS, THE FOOD YOU BOUGHT JUST DIDN'T LAST AND YOU DIDN'T HAVE MONEY TO GET MORE.: NEVER TRUE

## 2022-07-06 SDOH — ECONOMIC STABILITY: TRANSPORTATION INSECURITY
IN THE PAST 12 MONTHS, HAS THE LACK OF TRANSPORTATION KEPT YOU FROM MEDICAL APPOINTMENTS OR FROM GETTING MEDICATIONS?: NO

## 2022-07-06 SDOH — ECONOMIC STABILITY: HOUSING INSECURITY
IN THE LAST 12 MONTHS, WAS THERE A TIME WHEN YOU DID NOT HAVE A STEADY PLACE TO SLEEP OR SLEPT IN A SHELTER (INCLUDING NOW)?: NO

## 2022-07-06 SDOH — HEALTH STABILITY: PHYSICAL HEALTH: ON AVERAGE, HOW MANY DAYS PER WEEK DO YOU ENGAGE IN MODERATE TO STRENUOUS EXERCISE (LIKE A BRISK WALK)?: 3 DAYS

## 2022-07-06 SDOH — ECONOMIC STABILITY: TRANSPORTATION INSECURITY
IN THE PAST 12 MONTHS, HAS LACK OF TRANSPORTATION KEPT YOU FROM MEETINGS, WORK, OR FROM GETTING THINGS NEEDED FOR DAILY LIVING?: NO

## 2022-07-06 ASSESSMENT — PATIENT HEALTH QUESTIONNAIRE - PHQ9
4. FEELING TIRED OR HAVING LITTLE ENERGY: 1
3. TROUBLE FALLING OR STAYING ASLEEP: 1
SUM OF ALL RESPONSES TO PHQ QUESTIONS 1-9: 4
8. MOVING OR SPEAKING SO SLOWLY THAT OTHER PEOPLE COULD HAVE NOTICED. OR THE OPPOSITE, BEING SO FIGETY OR RESTLESS THAT YOU HAVE BEEN MOVING AROUND A LOT MORE THAN USUAL: 0
2. FEELING DOWN, DEPRESSED OR HOPELESS: 0
5. POOR APPETITE OR OVEREATING: 1
10. IF YOU CHECKED OFF ANY PROBLEMS, HOW DIFFICULT HAVE THESE PROBLEMS MADE IT FOR YOU TO DO YOUR WORK, TAKE CARE OF THINGS AT HOME, OR GET ALONG WITH OTHER PEOPLE: 1
6. FEELING BAD ABOUT YOURSELF - OR THAT YOU ARE A FAILURE OR HAVE LET YOURSELF OR YOUR FAMILY DOWN: 0
1. LITTLE INTEREST OR PLEASURE IN DOING THINGS: 1
9. THOUGHTS THAT YOU WOULD BE BETTER OFF DEAD, OR OF HURTING YOURSELF: 0
SUM OF ALL RESPONSES TO PHQ9 QUESTIONS 1 & 2: 1
7. TROUBLE CONCENTRATING ON THINGS, SUCH AS READING THE NEWSPAPER OR WATCHING TELEVISION: 0
SUM OF ALL RESPONSES TO PHQ QUESTIONS 1-9: 4

## 2022-07-06 ASSESSMENT — LIFESTYLE VARIABLES
HOW MANY STANDARD DRINKS CONTAINING ALCOHOL DO YOU HAVE ON A TYPICAL DAY: 1 OR 2
HOW OFTEN DO YOU HAVE A DRINK CONTAINING ALCOHOL: MONTHLY OR LESS

## 2022-07-06 ASSESSMENT — SOCIAL DETERMINANTS OF HEALTH (SDOH)
WITHIN THE LAST YEAR, HAVE YOU BEEN AFRAID OF YOUR PARTNER OR EX-PARTNER?: NO
HOW OFTEN DO YOU ATTEND CHURCH OR RELIGIOUS SERVICES?: 1 TO 4 TIMES PER YEAR
DO YOU BELONG TO ANY CLUBS OR ORGANIZATIONS SUCH AS CHURCH GROUPS UNIONS, FRATERNAL OR ATHLETIC GROUPS, OR SCHOOL GROUPS?: NO
IN A TYPICAL WEEK, HOW MANY TIMES DO YOU TALK ON THE PHONE WITH FAMILY, FRIENDS, OR NEIGHBORS?: MORE THAN THREE TIMES A WEEK
HOW HARD IS IT FOR YOU TO PAY FOR THE VERY BASICS LIKE FOOD, HOUSING, MEDICAL CARE, AND HEATING?: NOT VERY HARD
HOW OFTEN DO YOU ATTENT MEETINGS OF THE CLUB OR ORGANIZATION YOU BELONG TO?: NEVER
WITHIN THE LAST YEAR, HAVE YOU BEEN HUMILIATED OR EMOTIONALLY ABUSED IN OTHER WAYS BY YOUR PARTNER OR EX-PARTNER?: NO
HOW OFTEN DO YOU GET TOGETHER WITH FRIENDS OR RELATIVES?: ONCE A WEEK
WITHIN THE LAST YEAR, HAVE YOU BEEN KICKED, HIT, SLAPPED, OR OTHERWISE PHYSICALLY HURT BY YOUR PARTNER OR EX-PARTNER?: NO
WITHIN THE LAST YEAR, HAVE TO BEEN RAPED OR FORCED TO HAVE ANY KIND OF SEXUAL ACTIVITY BY YOUR PARTNER OR EX-PARTNER?: NO

## 2022-07-06 ASSESSMENT — ANXIETY QUESTIONNAIRES
IF YOU CHECKED OFF ANY PROBLEMS ON THIS QUESTIONNAIRE, HOW DIFFICULT HAVE THESE PROBLEMS MADE IT FOR YOU TO DO YOUR WORK, TAKE CARE OF THINGS AT HOME, OR GET ALONG WITH OTHER PEOPLE: SOMEWHAT DIFFICULT
GAD7 TOTAL SCORE: 5
4. TROUBLE RELAXING: 1
3. WORRYING TOO MUCH ABOUT DIFFERENT THINGS: 1
7. FEELING AFRAID AS IF SOMETHING AWFUL MIGHT HAPPEN: 0
2. NOT BEING ABLE TO STOP OR CONTROL WORRYING: 1
1. FEELING NERVOUS, ANXIOUS, OR ON EDGE: 1
5. BEING SO RESTLESS THAT IT IS HARD TO SIT STILL: 0
6. BECOMING EASILY ANNOYED OR IRRITABLE: 1

## 2022-07-06 NOTE — PROGRESS NOTES
PROGRESS NOTE    Chief Complaint   Patient presents with    Annual Exam     presenting for CPX and to discuss labs, HTN, HLD, Anemia and GERD. Currently taking Metoprolol and Atorvastatin.  Mass     patient reports painful bump on inner left thigh above knee. patient reports it only hurts when she touches it and has been there for about a week       SUBJECTIVE:     Trinity Silverman is a very pleasant 42 y.o. female with hx ofanxiety, GERD, hyperlipidemia, hypertension and migraine, seen today in office for her annual physical. She is doing very well overall with healthy diet and weight loss regimen. She continues to loose another 7 pounds since last visit. She does express increased stress and anxiety. She has been taking more buspar frequently but reports improvement in symptoms. She is taking care of her mother in law who has been in and out of the hospital for the last month. Patient reports no chest pain, no shortness of breath, no palpitation, no unilateral or focal weakness, no abdominal pain, no nausea, no vomiting, no diarrhea, no thoughts or plans of self-harm or suicide or homicide. Of note, patient does have complaints of a small cystlike bump that has been present to her left inner upper thigh for the last week. She reports tenderness with palpation but no fever or chills, no injuries or falls or trauma, no swelling, no ecchymosis, no redness or open wound or lesion. Past Medical History, Past Surgical History, Family history, Social History, and Medications were all reviewed with the patient today and updated as necessary. Current Outpatient Medications   Medication Sig Dispense Refill    metoprolol tartrate (LOPRESSOR) 25 MG tablet Take 1 tablet by mouth daily For blood pressure 90 tablet 1    tiZANidine (ZANAFLEX) 4 MG tablet Take 1/2 to 1 tablet orally at bedtime as needed for muscle spasm.  May cause drowsiness 30 tablet 0    albuterol sulfate HFA (PROAIR HFA) 108 (90 Base) MCG/ACT inhaler USE 2 PUFFS EVERY 4 TO 6 HOURS AS NEEDED FOR SHORTNESS OF BREATH/WHEEZING/COUGH Indications: asthma attack      atorvastatin (LIPITOR) 40 MG tablet Take 40 mg by mouth daily      busPIRone (BUSPAR) 5 MG tablet TAKE 1 TABLET BY MOUTH TWICE A DAY Indications: repeated episodes of anxiety      vitamin D 25 MCG (1000 UT) CAPS Take by mouth daily      clobetasol (TEMOVATE) 0.05 % ointment Use up to 2 times a day with a flair or at least 2 times a month to decrease inflammation      meloxicam (MOBIC) 7.5 MG tablet Take 7.5 mg by mouth daily as needed      norethindrone-ethinyl estradiol (NORTREL 7/7/7) 0.5/0.75/1-35 MG-MCG per tablet TAKE 1 TABLET BY MOUTH EVERY DAY      pantoprazole (PROTONIX) 40 MG tablet TAKE 1 TABLET BY MOUTH EVERY DAY Indications: gastroesophageal reflux disease      senna-docusate (PERICOLACE) 8.6-50 MG per tablet Take 2 tablets by mouth      SUMAtriptan (IMITREX) 50 MG tablet Take 1/2 to 1 tablet orally once daily as needed for headache       No current facility-administered medications for this visit. No Known Allergies  Patient Active Problem List   Diagnosis    Essential hypertension    Lichen sclerosus et atrophicus of the vulva    Gastroesophageal reflux disease without esophagitis    Pure hypercholesterolemia    Iron deficiency anemia     Past Medical History:   Diagnosis Date    Asthma     use inhaler    GERD (gastroesophageal reflux disease)     Hypertension, essential      History reviewed. No pertinent surgical history. Family History   Problem Relation Age of Onset    Breast Cancer Neg Hx     Depression Mother     Hypertension Mother     Diabetes Mother         DM 2     Social History     Tobacco Use    Smoking status: Never Smoker    Smokeless tobacco: Never Used   Substance Use Topics    Alcohol use: No         REVIEW OF SYSTEM    Review of Systems   Constitutional: Negative.   Negative for activity change, appetite change, chills, diaphoresis, fatigue, fever and unexpected weight change. HENT: Negative. Negative for congestion, dental problem, drooling, ear discharge, ear pain, facial swelling, hearing loss, mouth sores, nosebleeds, postnasal drip, rhinorrhea, sinus pressure, sinus pain, sneezing, sore throat, tinnitus, trouble swallowing and voice change. Eyes: Negative. Negative for photophobia, pain, discharge, redness, itching and visual disturbance. Respiratory: Negative. Negative for apnea, cough, choking, chest tightness, shortness of breath, wheezing and stridor. Cardiovascular: Negative. Negative for chest pain, palpitations and leg swelling. Gastrointestinal: Negative. Negative for abdominal distention, abdominal pain, anal bleeding, blood in stool, constipation, diarrhea, nausea, rectal pain and vomiting. Endocrine: Negative. Negative for cold intolerance, heat intolerance, polydipsia, polyphagia and polyuria. Genitourinary: Negative. Negative for decreased urine volume, difficulty urinating, dysuria, enuresis, flank pain, frequency, genital sores, hematuria and urgency. Musculoskeletal: Negative. Negative for arthralgias, back pain, gait problem, joint swelling, myalgias, neck pain and neck stiffness. Skin: Negative. Negative for color change, pallor, rash and wound. Positive for small cystlike bump to left upper inner thigh   Allergic/Immunologic: Negative. Negative for environmental allergies, food allergies and immunocompromised state. Neurological: Negative. Negative for dizziness, tremors, seizures, syncope, facial asymmetry, speech difficulty, weakness, light-headedness, numbness and headaches. Hematological: Negative. Negative for adenopathy. Does not bruise/bleed easily. Psychiatric/Behavioral: Negative for agitation, behavioral problems, confusion, decreased concentration, dysphoric mood, hallucinations, self-injury, sleep disturbance and suicidal ideas.  The patient is nervous/anxious. The patient is not hyperactive. OBJECTIVE:  /82 (Site: Right Upper Arm, Position: Sitting)   Pulse 84   Ht 5' 4\" (1.626 m)   Wt 188 lb (85.3 kg)   SpO2 99%   BMI 32.27 kg/m²      Physical Exam  Constitutional:       General: She is not in acute distress. Appearance: Normal appearance. She is not ill-appearing, toxic-appearing or diaphoretic. HENT:      Head: Normocephalic and atraumatic. Right Ear: Tympanic membrane, ear canal and external ear normal. There is no impacted cerumen. Left Ear: Tympanic membrane, ear canal and external ear normal. There is no impacted cerumen. Nose: Nose normal.      Mouth/Throat:      Mouth: Mucous membranes are moist.      Pharynx: Oropharynx is clear. Eyes:      Extraocular Movements: Extraocular movements intact. Conjunctiva/sclera: Conjunctivae normal.      Pupils: Pupils are equal, round, and reactive to light. Neck:      Vascular: No carotid bruit. Cardiovascular:      Rate and Rhythm: Normal rate and regular rhythm. Pulses: Normal pulses. Heart sounds: Normal heart sounds. Pulmonary:      Effort: Pulmonary effort is normal. No respiratory distress. Breath sounds: Normal breath sounds. No stridor. No wheezing, rhonchi or rales. Chest:      Chest wall: No tenderness. Abdominal:      General: Abdomen is flat. Bowel sounds are normal. There is no distension. Palpations: Abdomen is soft. There is no shifting dullness, fluid wave, hepatomegaly, splenomegaly, mass or pulsatile mass. Tenderness: There is no abdominal tenderness. There is no right CVA tenderness, left CVA tenderness, guarding or rebound. Negative signs include Leonardo's sign, Rovsing's sign, McBurney's sign, psoas sign and obturator sign. Hernia: No hernia is present. Genitourinary:     Comments: Deferred  Musculoskeletal:         General: Normal range of motion.       Cervical back: Normal range of motion and neck supple. No rigidity or tenderness. Lymphadenopathy:      Cervical: No cervical adenopathy. Skin:     General: Skin is warm and dry. Capillary Refill: Capillary refill takes less than 2 seconds. Comments: Left upper inner thigh near knee with small cystlike lump approximately 1 cm in size that is deeper under tissue and is mildly tender to touch. There is no obvious swelling, no erythema, no open wound or lesion. Neurological:      General: No focal deficit present. Mental Status: She is alert and oriented to person, place, and time. Psychiatric:         Mood and Affect: Mood normal.         Behavior: Behavior normal.         Thought Content: Thought content normal.         Judgment: Judgment normal.       Medical problems and test results were reviewed with the patient today.    Lab Results   Component Value Date     06/28/2022    K 4.3 06/28/2022     06/28/2022    CO2 26 06/28/2022    BUN 17 06/28/2022    CREATININE 0.90 06/28/2022    GLUCOSE 78 06/28/2022    CALCIUM 9.1 06/28/2022    PROT 6.8 06/28/2022    LABALBU 3.4 (L) 06/28/2022    BILITOT 0.6 06/28/2022    ALKPHOS 66 06/28/2022    AST 25 06/28/2022    ALT 27 06/28/2022    LABGLOM >60 06/28/2022    GFRAA >60 06/28/2022    AGRATIO 1.7 12/21/2021    GLOB 3.4 06/28/2022     Lab Results   Component Value Date    WBC 8.9 06/28/2022    HGB 14.1 06/28/2022    HCT 42.1 06/28/2022    MCV 93.3 06/28/2022     06/28/2022     Lab Results   Component Value Date    TSH 2.000 05/14/2021     Lab Results   Component Value Date/Time    VITD25 60.0 06/28/2022 09:04 AM        Lab Results   Component Value Date    CHOL 194 06/28/2022    CHOL 190 12/21/2021    CHOL 188 05/14/2021     Lab Results   Component Value Date    TRIG 70 06/28/2022    TRIG 96 12/21/2021    TRIG 91 05/14/2021     Lab Results   Component Value Date    HDL 78 (H) 06/28/2022    HDL 58 12/21/2021    HDL 64 05/14/2021     Lab Results   Component Value Date    LDLCALC 102 (H) 06/28/2022    LDLCALC 115 (H) 12/21/2021    LDLCALC 108 (H) 05/14/2021     Lab Results   Component Value Date    LABVLDL 14 06/28/2022    LABVLDL 19 08/10/2020    LABVLDL 20 12/13/2019    VLDL 17 12/21/2021    VLDL 16 05/14/2021     Lab Results   Component Value Date    CHOLHDLRATIO 2.5 06/28/2022           ASSESSMENT and PLAN    1. Routine general medical examination at a health care facility   Anticipatory guidance for age-seatbelts, avoid cell phone use in car (may use hands free), no texting while driving, avoid social drugs and tobacco, limit alcohol, fall safety, personal safety with appropriate use of helmets and equipment for protection, and appropriate use of sun screen and sun protection to prevent skin cancer. Encourage healthy diet and exercise daily. Patient currently follows GYN for her GYN health and is up-to-date with her cervical cancer and breast cancer screening. Patient is also up-to-date with her vaccination. Last Tdap was in 2016.    2. Essential hypertension  -     metoprolol tartrate (LOPRESSOR) 25 MG tablet; Take 1 tablet by mouth daily For blood pressure, Disp-90 tablet, R-1Normal  -     Comprehensive Metabolic Panel; Future  -     TSH; Future    Blood pressure today in office 120/82. Stable. Continue metoprolol daily. 3. Pure hypercholesterolemia  -     Comprehensive Metabolic Panel; Future  -     Lipid Panel; Future    Lipid panel remains stable with LDL of 102. Continue current therapy. Repeat fasting labs in 6 months. 4. Gastroesophageal reflux disease without esophagitis  -     CBC with Auto Differential; Future    Stable. Continue current therapy. 5. Iron deficiency anemia, unspecified iron deficiency anemia type  -     CBC with Auto Differential; Future    Stable. Hemoglobin is 14.1 and hematocrit is 42.1. Continue current therapy. 6. Chronic midline low back pain without sciatica  -     tiZANidine (ZANAFLEX) 4 MG tablet;  Take 1/2 to 1 tablet orally at bedtime as needed for muscle spasm. May cause drowsiness, Disp-30 tablet, R-0Normal    Patient with intermittent low back discomfort with tightness that is relieved with stretching and range of motion. Patient reports tightness and spasm mainly noticeable in the morning after waking up. Patient to continue with supportive care with heat/ice therapy, gentle stretching exercises and strengthening of core muscle for prevention. We will also start patient on tizanidine as needed for muscle spasm. Review risk and side effects of medication including drowsiness, dizziness, increased risk of constipation, increased risk of injury. Patient was advised not to take medication and drive or operate machinery due to increased risk of injury. Patient was also advised to exercise caution when getting up after taking medication to avoid falls. Advised patient has no symptoms do not improve or resolve. Patient may benefit from a formal course of physical therapy if symptoms persist or worsen. 7. Colon cancer screening  -     [facility] - Colonoscopy    Patient is due for colon cancer screening due to new guideline changes recommendation. Patient will be turning 47 on August 10. Discussed screening option including FOBT, Cologuard or colonoscopy. Patient is amenable to having colonoscopy completed. Referral placed for consideration. 8. Vitamin D deficiency  -     Vitamin D 25 Hydroxy; Future    Stable. Continue current therapy. Recheck level in 6 months.       Orders Placed This Encounter   Procedures    CBC with Auto Differential     Standing Status:   Future     Standing Expiration Date:   7/7/2023    Comprehensive Metabolic Panel     Standing Status:   Future     Standing Expiration Date:   7/7/2023    Lipid Panel     Standing Status:   Future     Standing Expiration Date:   7/7/2023     Order Specific Question:   Is Patient Fasting?/# of Hours     Answer:   8    Vitamin D 25 Hydroxy Standing Status:   Future     Standing Expiration Date:   7/7/2023    TSH     Standing Status:   Future     Standing Expiration Date:   7/7/2023    [facility], [address] [place] [address] - Colonoscopy     Referral Priority:   Routine     Referral Type:   Eval and Treat     Referral Reason:   Specialty Services Required     Number of Visits Requested:   1         Elements of this note have been dictated using speech recognition software. As a result, errors of speech recognition may have occurred. On this date 7/6/2022 I have spent 30 minutes reviewing previous notes, test results and face to face with the patient discussing the diagnosis and importance of compliance with the treatment plan as well as documenting on the day of the visit. Greater than 50% of this visit was spent counseling the patient about test results, prognosis, importance of compliance, education about disease process, benefits of medications, instructions for management of acute symptoms, and follow up plans. Return in about 6 months (around 1/6/2023) for Follow up with fasting labs prior.      SHAMAR Tapia - CNP

## 2022-07-07 ASSESSMENT — ENCOUNTER SYMPTOMS
ABDOMINAL DISTENTION: 0
COUGH: 0
EYES NEGATIVE: 1
FACIAL SWELLING: 0
BACK PAIN: 0
EYE PAIN: 0
EYE ITCHING: 0
BLOOD IN STOOL: 0
ANAL BLEEDING: 0
EYE DISCHARGE: 0
VOMITING: 0
APNEA: 0
VOICE CHANGE: 0
CHOKING: 0
RESPIRATORY NEGATIVE: 1
PHOTOPHOBIA: 0
COLOR CHANGE: 0
RHINORRHEA: 0
CHEST TIGHTNESS: 0
ALLERGIC/IMMUNOLOGIC NEGATIVE: 1
RECTAL PAIN: 0
SINUS PAIN: 0
GASTROINTESTINAL NEGATIVE: 1
DIARRHEA: 0
SINUS PRESSURE: 0
STRIDOR: 0
ABDOMINAL PAIN: 0
CONSTIPATION: 0
EYE REDNESS: 0
SORE THROAT: 0
NAUSEA: 0
WHEEZING: 0
TROUBLE SWALLOWING: 0
SHORTNESS OF BREATH: 0

## 2022-08-02 DIAGNOSIS — G89.29 CHRONIC MIDLINE LOW BACK PAIN WITHOUT SCIATICA: ICD-10-CM

## 2022-08-02 DIAGNOSIS — M54.50 CHRONIC MIDLINE LOW BACK PAIN WITHOUT SCIATICA: ICD-10-CM

## 2022-08-02 RX ORDER — TIZANIDINE 4 MG/1
TABLET ORAL
Qty: 30 TABLET | Refills: 0 | Status: SHIPPED | OUTPATIENT
Start: 2022-08-02

## 2022-08-29 ENCOUNTER — HOSPITAL ENCOUNTER (OUTPATIENT)
Dept: MAMMOGRAPHY | Age: 47
Discharge: HOME | End: 2022-09-01

## 2022-08-29 DIAGNOSIS — Z12.31 VISIT FOR SCREENING MAMMOGRAM: ICD-10-CM

## 2022-12-27 ENCOUNTER — CLINICAL DOCUMENTATION (OUTPATIENT)
Dept: SURGERY | Age: 47
End: 2022-12-27

## 2022-12-27 NOTE — PROGRESS NOTES
Pt has no family history of colon cancer  Pt is not taking anticoagulation  Pt has no previous colonoscopies discoverable through chart review    I have reviewed the patient's chart and consider the patient an acceptable risk for screening colonoscopy without a formal office visit. We will contact the patient to give the details of the bowel prep and to schedule screening colonoscopy in the near future. Once the colonoscopy has been completed, the Health Maintenance will be updated accordingly.      SHAMAR Deluca - NP

## 2022-12-28 DIAGNOSIS — I10 ESSENTIAL HYPERTENSION: ICD-10-CM

## 2022-12-29 ENCOUNTER — PREP FOR PROCEDURE (OUTPATIENT)
Dept: SURGERY | Age: 47
End: 2022-12-29

## 2022-12-29 PROBLEM — Z12.11 SCREENING FOR COLON CANCER: Status: ACTIVE | Noted: 2022-12-29

## 2023-01-05 ENCOUNTER — NURSE ONLY (OUTPATIENT)
Dept: FAMILY MEDICINE CLINIC | Facility: CLINIC | Age: 48
End: 2023-01-05

## 2023-01-05 DIAGNOSIS — D50.9 IRON DEFICIENCY ANEMIA, UNSPECIFIED IRON DEFICIENCY ANEMIA TYPE: ICD-10-CM

## 2023-01-05 DIAGNOSIS — I10 ESSENTIAL HYPERTENSION: ICD-10-CM

## 2023-01-05 DIAGNOSIS — E78.00 PURE HYPERCHOLESTEROLEMIA: ICD-10-CM

## 2023-01-05 DIAGNOSIS — E55.9 VITAMIN D DEFICIENCY: ICD-10-CM

## 2023-01-05 DIAGNOSIS — K21.9 GASTROESOPHAGEAL REFLUX DISEASE WITHOUT ESOPHAGITIS: ICD-10-CM

## 2023-01-06 LAB
25(OH)D3 SERPL-MCNC: 39.7 NG/ML (ref 30–100)
ALBUMIN SERPL-MCNC: 3.1 G/DL (ref 3.5–5)
ALBUMIN/GLOB SERPL: 1 {RATIO} (ref 0.4–1.6)
ALP SERPL-CCNC: 57 U/L (ref 50–136)
ALT SERPL-CCNC: 27 U/L (ref 12–65)
ANION GAP SERPL CALC-SCNC: 4 MMOL/L (ref 2–11)
AST SERPL-CCNC: 24 U/L (ref 15–37)
BASOPHILS # BLD: 0.1 K/UL (ref 0–0.2)
BASOPHILS NFR BLD: 1 % (ref 0–2)
BILIRUB SERPL-MCNC: 0.7 MG/DL (ref 0.2–1.1)
BUN SERPL-MCNC: 11 MG/DL (ref 6–23)
CALCIUM SERPL-MCNC: 8.9 MG/DL (ref 8.3–10.4)
CHLORIDE SERPL-SCNC: 109 MMOL/L (ref 101–110)
CHOLEST SERPL-MCNC: 193 MG/DL
CO2 SERPL-SCNC: 27 MMOL/L (ref 21–32)
CREAT SERPL-MCNC: 0.9 MG/DL (ref 0.6–1)
DIFFERENTIAL METHOD BLD: NORMAL
EOSINOPHIL # BLD: 0.4 K/UL (ref 0–0.8)
EOSINOPHIL NFR BLD: 5 % (ref 0.5–7.8)
ERYTHROCYTE [DISTWIDTH] IN BLOOD BY AUTOMATED COUNT: 12.4 % (ref 11.9–14.6)
GLOBULIN SER CALC-MCNC: 3.1 G/DL (ref 2.8–4.5)
GLUCOSE SERPL-MCNC: 96 MG/DL (ref 65–100)
HCT VFR BLD AUTO: 40.5 % (ref 35.8–46.3)
HDLC SERPL-MCNC: 75 MG/DL (ref 40–60)
HDLC SERPL: 2.6 {RATIO}
HGB BLD-MCNC: 13.2 G/DL (ref 11.7–15.4)
IMM GRANULOCYTES # BLD AUTO: 0 K/UL (ref 0–0.5)
IMM GRANULOCYTES NFR BLD AUTO: 0 % (ref 0–5)
LDLC SERPL CALC-MCNC: 103.4 MG/DL
LYMPHOCYTES # BLD: 3.3 K/UL (ref 0.5–4.6)
LYMPHOCYTES NFR BLD: 42 % (ref 13–44)
MCH RBC QN AUTO: 31.4 PG (ref 26.1–32.9)
MCHC RBC AUTO-ENTMCNC: 32.6 G/DL (ref 31.4–35)
MCV RBC AUTO: 96.2 FL (ref 82–102)
MONOCYTES # BLD: 0.6 K/UL (ref 0.1–1.3)
MONOCYTES NFR BLD: 8 % (ref 4–12)
NEUTS SEG # BLD: 3.5 K/UL (ref 1.7–8.2)
NEUTS SEG NFR BLD: 44 % (ref 43–78)
NRBC # BLD: 0 K/UL (ref 0–0.2)
PLATELET # BLD AUTO: 342 K/UL (ref 150–450)
PMV BLD AUTO: 9.6 FL (ref 9.4–12.3)
POTASSIUM SERPL-SCNC: 4.1 MMOL/L (ref 3.5–5.1)
PROT SERPL-MCNC: 6.2 G/DL (ref 6.3–8.2)
RBC # BLD AUTO: 4.21 M/UL (ref 4.05–5.2)
SODIUM SERPL-SCNC: 140 MMOL/L (ref 133–143)
TRIGL SERPL-MCNC: 73 MG/DL (ref 35–150)
TSH, 3RD GENERATION: 1.55 UIU/ML (ref 0.36–3.74)
VLDLC SERPL CALC-MCNC: 14.6 MG/DL (ref 6–23)
WBC # BLD AUTO: 7.8 K/UL (ref 4.3–11.1)

## 2023-01-12 ENCOUNTER — OFFICE VISIT (OUTPATIENT)
Dept: FAMILY MEDICINE CLINIC | Facility: CLINIC | Age: 48
End: 2023-01-12
Payer: COMMERCIAL

## 2023-01-12 VITALS
HEIGHT: 64 IN | BODY MASS INDEX: 32.4 KG/M2 | DIASTOLIC BLOOD PRESSURE: 84 MMHG | SYSTOLIC BLOOD PRESSURE: 122 MMHG | OXYGEN SATURATION: 98 % | HEART RATE: 71 BPM | WEIGHT: 189.8 LBS

## 2023-01-12 DIAGNOSIS — G89.29 CHRONIC MIDLINE LOW BACK PAIN WITHOUT SCIATICA: ICD-10-CM

## 2023-01-12 DIAGNOSIS — F41.1 GENERALIZED ANXIETY DISORDER: ICD-10-CM

## 2023-01-12 DIAGNOSIS — R29.818 SUSPECTED SLEEP APNEA: ICD-10-CM

## 2023-01-12 DIAGNOSIS — R00.2 PALPITATION: Primary | ICD-10-CM

## 2023-01-12 DIAGNOSIS — I10 ESSENTIAL HYPERTENSION: ICD-10-CM

## 2023-01-12 DIAGNOSIS — M54.50 CHRONIC MIDLINE LOW BACK PAIN WITHOUT SCIATICA: ICD-10-CM

## 2023-01-12 DIAGNOSIS — G47.9 SLEEP DISTURBANCE: ICD-10-CM

## 2023-01-12 DIAGNOSIS — Z23 ENCOUNTER FOR IMMUNIZATION: ICD-10-CM

## 2023-01-12 PROCEDURE — 3074F SYST BP LT 130 MM HG: CPT | Performed by: NURSE PRACTITIONER

## 2023-01-12 PROCEDURE — 93000 ELECTROCARDIOGRAM COMPLETE: CPT | Performed by: NURSE PRACTITIONER

## 2023-01-12 PROCEDURE — 99214 OFFICE O/P EST MOD 30 MIN: CPT | Performed by: NURSE PRACTITIONER

## 2023-01-12 PROCEDURE — 90471 IMMUNIZATION ADMIN: CPT | Performed by: NURSE PRACTITIONER

## 2023-01-12 PROCEDURE — 90715 TDAP VACCINE 7 YRS/> IM: CPT | Performed by: NURSE PRACTITIONER

## 2023-01-12 PROCEDURE — 3079F DIAST BP 80-89 MM HG: CPT | Performed by: NURSE PRACTITIONER

## 2023-01-12 RX ORDER — AMOXICILLIN 250 MG
2 CAPSULE ORAL DAILY
Qty: 180 TABLET | Refills: 3 | Status: SHIPPED | OUTPATIENT
Start: 2023-01-12

## 2023-01-12 RX ORDER — TIZANIDINE 4 MG/1
TABLET ORAL
Qty: 30 TABLET | Refills: 5 | Status: SHIPPED | OUTPATIENT
Start: 2023-01-12

## 2023-01-12 RX ORDER — ATORVASTATIN CALCIUM 40 MG/1
40 TABLET, FILM COATED ORAL DAILY
Qty: 90 TABLET | Refills: 3 | Status: SHIPPED | OUTPATIENT
Start: 2023-01-12

## 2023-01-12 RX ORDER — MELOXICAM 7.5 MG/1
7.5 TABLET ORAL DAILY PRN
Qty: 30 TABLET | Refills: 5 | Status: SHIPPED | OUTPATIENT
Start: 2023-01-12

## 2023-01-12 RX ORDER — PANTOPRAZOLE SODIUM 40 MG/1
TABLET, DELAYED RELEASE ORAL
Qty: 90 TABLET | Refills: 3 | Status: SHIPPED | OUTPATIENT
Start: 2023-01-12

## 2023-01-12 RX ORDER — BUSPIRONE HYDROCHLORIDE 5 MG/1
TABLET ORAL
Qty: 180 TABLET | Refills: 3 | Status: SHIPPED | OUTPATIENT
Start: 2023-01-12

## 2023-01-12 ASSESSMENT — ENCOUNTER SYMPTOMS
VOICE CHANGE: 0
RHINORRHEA: 0
BLOOD IN STOOL: 0
GASTROINTESTINAL NEGATIVE: 1
RESPIRATORY NEGATIVE: 1
APNEA: 0
COUGH: 0
SHORTNESS OF BREATH: 0
EYE PAIN: 0
BACK PAIN: 0
ALLERGIC/IMMUNOLOGIC NEGATIVE: 1
ABDOMINAL DISTENTION: 0
CONSTIPATION: 0
CHOKING: 0
EYES NEGATIVE: 1
PHOTOPHOBIA: 0
FACIAL SWELLING: 0
COLOR CHANGE: 0
SINUS PRESSURE: 0
RECTAL PAIN: 0
ANAL BLEEDING: 0
WHEEZING: 0
EYE ITCHING: 0
DIARRHEA: 0
SINUS PAIN: 0
ABDOMINAL PAIN: 0
EYE REDNESS: 0
EYE DISCHARGE: 0
NAUSEA: 0
STRIDOR: 0
SORE THROAT: 0
TROUBLE SWALLOWING: 0
VOMITING: 0
CHEST TIGHTNESS: 0

## 2023-01-12 ASSESSMENT — PATIENT HEALTH QUESTIONNAIRE - PHQ9
1. LITTLE INTEREST OR PLEASURE IN DOING THINGS: 0
SUM OF ALL RESPONSES TO PHQ QUESTIONS 1-9: 0
2. FEELING DOWN, DEPRESSED OR HOPELESS: 0
SUM OF ALL RESPONSES TO PHQ9 QUESTIONS 1 & 2: 0

## 2023-01-12 ASSESSMENT — SLEEP AND FATIGUE QUESTIONNAIRES
HOW LIKELY ARE YOU TO NOD OFF OR FALL ASLEEP WHILE SITTING QUIETLY AFTER LUNCH WITHOUT ALCOHOL: 0
HOW LIKELY ARE YOU TO NOD OFF OR FALL ASLEEP WHILE SITTING AND TALKING TO SOMEONE: 0
HOW LIKELY ARE YOU TO NOD OFF OR FALL ASLEEP IN A CAR, WHILE STOPPED FOR A FEW MINUTES IN TRAFFIC: 0
HOW LIKELY ARE YOU TO NOD OFF OR FALL ASLEEP WHILE SITTING AND READING: 1
HOW LIKELY ARE YOU TO NOD OFF OR FALL ASLEEP WHILE LYING DOWN TO REST IN THE AFTERNOON WHEN CIRCUMSTANCES PERMIT: 2
NECK CIRCUMFERENCE (INCHES): 12.75
HOW LIKELY ARE YOU TO NOD OFF OR FALL ASLEEP WHILE SITTING INACTIVE IN A PUBLIC PLACE: 0
HOW LIKELY ARE YOU TO NOD OFF OR FALL ASLEEP WHILE WATCHING TV: 1
HOW LIKELY ARE YOU TO NOD OFF OR FALL ASLEEP WHEN YOU ARE A PASSENGER IN A CAR FOR AN HOUR WITHOUT A BREAK: 0
ESS TOTAL SCORE: 4

## 2023-01-12 NOTE — PROGRESS NOTES
PROGRESS NOTE    Chief Complaint   Patient presents with    Follow-up     Presenting for 6 month follow up to discuss labs, HTN, GERD, HLD and anemia. Reports intermittent rapid HR in the morning, lasting for 3-4 minutes then returning to normal.        SUBJECTIVE:     Chaya El is a very pleasant 41 y.o. female with hx of anxiety, GERD, hyperlipidemia, hypertension and migraine, seen today in office for follow-up for lab results review. She is also having concerns of experiencing episodes of palpitation and fast heart rate. She reports symptoms started several months ago. Seems to be worsening and more frequent. She reports now experiencing about 3-4 episodes per day. This episode may last up to 30 minutes. No aggravating factors. 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 She reports she is \"doing 3 people's job\" currently. She reports no chest pain, no shortness of breath and no exacerbation of symptoms with exertion. She does have a history of GERD. She reports no recent exacerbation of GERD symptoms. Has been taking Protonix daily with good results. However, she does have some bloating symptoms. She endorses constipation and also admits to having more difficulty recently. She has been drinking plenty of water and has been increasing fiber. She is not taking any stool softeners at this time. Of note, patient has been evaluated by cardiology 8 to 9 years prior for tachycardia. She is currently taking metoprolol for treatment. She reports no active chest pain, no shortness of breath, no dyspnea with exertion, no worsening in symptoms with exertion, no diaphoresis, no jaw pain, no left arm pain, no edema, no orthopnea, no PND, no nausea, no vomiting, no diarrhea, no melena, no hematochezia, no hematemesis, no hemoptysis, no hematuria, no incontinence of bladder or bowel function.     Also upon further questioning, she reports having some sleep disturbance. Reports being a light sleeper and has been attributing to worrying about the next day and task needing to be complete. She reports she has never been told that she snores. She does endorse \"jumping in my sleep\". She has never been evaluated for sleep apnea. Past Medical History, Past Surgical History, Family history, Social History, and Medications were all reviewed with the patient today and updated as necessary.        Current Outpatient Medications   Medication Sig Dispense Refill    metoprolol tartrate (LOPRESSOR) 25 MG tablet Take 1 tablet by mouth daily For blood pressure 90 tablet 3    atorvastatin (LIPITOR) 40 MG tablet Take 1 tablet by mouth daily 90 tablet 3    tiZANidine (ZANAFLEX) 4 MG tablet Take 1/2 to 1 tablet orally at bedtime as needed for muscle relaxers 30 tablet 5    meloxicam (MOBIC) 7.5 MG tablet Take 1 tablet by mouth daily as needed for Pain 30 tablet 5    busPIRone (BUSPAR) 5 MG tablet TAKE 1 TABLET BY MOUTH TWICE A DAY Indications: repeated episodes of anxiety 180 tablet 3    senna-docusate (PERICOLACE) 8.6-50 MG per tablet Take 2 tablets by mouth daily For stools 180 tablet 3    pantoprazole (PROTONIX) 40 MG tablet TAKE 1 TABLET BY MOUTH EVERY DAY Indications: gastroesophageal reflux disease 90 tablet 3    albuterol sulfate HFA (PROVENTIL;VENTOLIN;PROAIR) 108 (90 Base) MCG/ACT inhaler USE 2 PUFFS EVERY 4 TO 6 HOURS AS NEEDED FOR SHORTNESS OF BREATH/WHEEZING/COUGH Indications: asthma attack      vitamin D 25 MCG (1000 UT) CAPS Take by mouth daily      clobetasol (TEMOVATE) 0.05 % ointment Use up to 2 times a day with a flair or at least 2 times a month to decrease inflammation      norethindrone-ethinyl estradiol (NORTREL 7/7/7) 0.5/0.75/1-35 MG-MCG per tablet TAKE 1 TABLET BY MOUTH EVERY DAY      SUMAtriptan (IMITREX) 50 MG tablet Take 1/2 to 1 tablet orally once daily as needed for headache       No current facility-administered medications for this visit. No Known Allergies  Patient Active Problem List   Diagnosis    Essential hypertension    Lichen sclerosus et atrophicus of the vulva    Gastroesophageal reflux disease without esophagitis    Pure hypercholesterolemia    Iron deficiency anemia    Screening for colon cancer     Past Medical History:   Diagnosis Date    Asthma     use inhaler    GERD (gastroesophageal reflux disease)     Hypertension, essential      History reviewed. No pertinent surgical history. Family History   Problem Relation Age of Onset    Breast Cancer Neg Hx     Depression Mother     Hypertension Mother     Diabetes Mother         DM 2     Social History     Tobacco Use    Smoking status: Never    Smokeless tobacco: Never   Substance Use Topics    Alcohol use: No         REVIEW OF SYSTEM    Review of Systems   Constitutional: Negative. Negative for activity change, appetite change, chills, diaphoresis, fatigue, fever and unexpected weight change. HENT: Negative. Negative for congestion, dental problem, drooling, ear discharge, ear pain, facial swelling, hearing loss, mouth sores, nosebleeds, postnasal drip, rhinorrhea, sinus pressure, sinus pain, sneezing, sore throat, tinnitus, trouble swallowing and voice change. Eyes: Negative. Negative for photophobia, pain, discharge, redness, itching and visual disturbance. Respiratory: Negative. Negative for apnea, cough, choking, chest tightness, shortness of breath, wheezing and stridor. Cardiovascular:  Positive for palpitations. Negative for chest pain and leg swelling. Gastrointestinal: Negative. Negative for abdominal distention, abdominal pain, anal bleeding, blood in stool, constipation, diarrhea, nausea, rectal pain and vomiting. Endocrine: Negative. Negative for cold intolerance, heat intolerance, polydipsia, polyphagia and polyuria. Genitourinary: Negative.   Negative for decreased urine volume, difficulty urinating, dysuria, enuresis, flank pain, frequency, genital sores, hematuria and urgency. Musculoskeletal: Negative. Negative for arthralgias, back pain, gait problem, joint swelling, myalgias, neck pain and neck stiffness. Skin: Negative. Negative for color change, pallor, rash and wound. Allergic/Immunologic: Negative. Negative for environmental allergies, food allergies and immunocompromised state. Neurological: Negative. Negative for dizziness, tremors, seizures, syncope, facial asymmetry, speech difficulty, weakness, light-headedness, numbness and headaches. Hematological: Negative. Negative for adenopathy. Does not bruise/bleed easily. Psychiatric/Behavioral:  Positive for sleep disturbance. Negative for agitation, behavioral problems, confusion, decreased concentration, dysphoric mood, hallucinations, self-injury and suicidal ideas. The patient is not nervous/anxious and is not hyperactive. OBJECTIVE:  /84   Pulse 71   Ht 5' 4\" (1.626 m)   Wt 189 lb 12.8 oz (86.1 kg)   LMP 01/04/2023   SpO2 98%   BMI 32.58 kg/m²      Physical Exam  Constitutional:       General: She is not in acute distress. Appearance: Normal appearance. She is not ill-appearing, toxic-appearing or diaphoretic. HENT:      Head: Normocephalic and atraumatic. Right Ear: Tympanic membrane, ear canal and external ear normal. There is no impacted cerumen. Left Ear: Tympanic membrane, ear canal and external ear normal. There is no impacted cerumen. Nose: Nose normal.      Mouth/Throat:      Mouth: Mucous membranes are moist.      Pharynx: Oropharynx is clear. Comments: Small narrow airway  Eyes:      Extraocular Movements: Extraocular movements intact. Conjunctiva/sclera: Conjunctivae normal.      Pupils: Pupils are equal, round, and reactive to light. Neck:      Vascular: No carotid bruit.       Comments: Neck circumference measured at 12.75 inch  Cardiovascular:      Rate and Rhythm: Normal rate and regular rhythm. Pulses: Normal pulses. Heart sounds: Normal heart sounds. Pulmonary:      Effort: Pulmonary effort is normal. No respiratory distress. Breath sounds: Normal breath sounds. No stridor. No wheezing, rhonchi or rales. Chest:      Chest wall: No tenderness. Abdominal:      General: Abdomen is flat. Bowel sounds are normal. There is no distension. Palpations: Abdomen is soft. There is no shifting dullness, fluid wave, hepatomegaly, splenomegaly, mass or pulsatile mass. Tenderness: There is no abdominal tenderness. There is no right CVA tenderness, left CVA tenderness, guarding or rebound. Negative signs include Leonardo's sign, Rovsing's sign, McBurney's sign, psoas sign and obturator sign. Hernia: No hernia is present. Musculoskeletal:         General: Normal range of motion. Cervical back: Normal range of motion and neck supple. No rigidity or tenderness. Lymphadenopathy:      Cervical: No cervical adenopathy. Skin:     General: Skin is warm and dry. Capillary Refill: Capillary refill takes less than 2 seconds. Neurological:      General: No focal deficit present. Mental Status: She is alert and oriented to person, place, and time. Psychiatric:         Mood and Affect: Mood normal.         Behavior: Behavior normal.         Thought Content: Thought content normal.         Judgment: Judgment normal.         Medical problems and test results were reviewed with the patient today.    Lab Results   Component Value Date     01/05/2023    K 4.1 01/05/2023     01/05/2023    CO2 27 01/05/2023    BUN 11 01/05/2023    CREATININE 0.90 01/05/2023    GLUCOSE 96 01/05/2023    CALCIUM 8.9 01/05/2023    PROT 6.2 (L) 01/05/2023    LABALBU 3.1 (L) 01/05/2023    BILITOT 0.7 01/05/2023    ALKPHOS 57 01/05/2023    AST 24 01/05/2023    ALT 27 01/05/2023    LABGLOM >60 01/05/2023    GFRAA >60 06/28/2022    AGRATIO 1.7 12/21/2021    GLOB 3.1 01/05/2023       Lab Results   Component Value Date    WBC 7.8 01/05/2023    HGB 13.2 01/05/2023    HCT 40.5 01/05/2023    MCV 96.2 01/05/2023     01/05/2023     Lab Results   Component Value Date    CHOL 193 01/05/2023    CHOL 194 06/28/2022    CHOL 190 12/21/2021     Lab Results   Component Value Date    TRIG 73 01/05/2023    TRIG 70 06/28/2022    TRIG 96 12/21/2021     Lab Results   Component Value Date    HDL 75 (H) 01/05/2023    HDL 78 (H) 06/28/2022    HDL 58 12/21/2021     Lab Results   Component Value Date    LDLCALC 103.4 (H) 01/05/2023    LDLCALC 102 (H) 06/28/2022    LDLCALC 115 (H) 12/21/2021     Lab Results   Component Value Date    LABVLDL 14.6 01/05/2023    LABVLDL 14 06/28/2022    LABVLDL 19 08/10/2020    VLDL 17 12/21/2021    VLDL 16 05/14/2021     Lab Results   Component Value Date    CHOLHDLRATIO 2.6 01/05/2023    CHOLHDLRATIO 2.5 06/28/2022     Lab Results   Component Value Date/Time    VITD25 39.7 01/05/2023 08:22 AM      12 Lead ECG    EKG: there are no previous tracings available for comparison, normal axis, sinus bradycardia, poor R wave progression from V1 to V2. Questionable inverted T wave to V2. No acute ischemic changes noted. SHAMAR De La Cruz - CYNTHIA      ASSESSMENT and PLAN    1. Palpitation  -     EKG 12 Lead; Future  -     [address]    Patient with a history of tachycardia. She reports she was evaluated 8 to 9 years prior by cardiology and was given metoprolol. She has been on medication since. She reports symptoms started more recently in the last few months but has increased in frequency. Reports now noticing to 3 times per day and each episodes may last 2 to 3 minutes. No aggravating factors. She does admit to having more anxiety. Etiology include underlying cardiac arrhythmia, anxiety, gastritis. EKG showed sinus bradycardia with a poor R wave progression from V1 to V2 with possible flattened/inverted T wave in V2 lead. Discussed case with supervising physician Dr. Oswaldo Palumbo. As patient is currently on metoprolol with a pulse of 50 and has intermittent tachycardia/palpitations symptom, may be more anxiety related but could be underlying cardiac condition. Referral placed to cardiology for consideration. We will also have patient continue BuSpar twice daily as currently prescribed for anxiety treatment. Patient to go to the ED with any worsening in symptoms, chest pain or shortness of breath. 2. Essential hypertension  -     metoprolol tartrate (LOPRESSOR) 25 MG tablet; Take 1 tablet by mouth daily For blood pressure, Disp-90 tablet, R-3Normal    Blood pressure today in office is 122/84. Stable. Continue with current therapy. 3. Sleep disturbance  -     [facility] - [facility], [facility]    Charlotte sleep score is low at 4 and neck circumference measurement at 12.75 inch. Patient does have reports of sleep disturbance and being a light sleeper. She has never been evaluated for sleep apnea. Referral placed for consideration. 4. Suspected sleep apnea  -     [address]    As above. 5. Encounter for immunization  -     Tdap, [address] [facility], (age 8 yrs+),     Patient is due for Tdap and annual flu vaccine. Discussed current CDC recommendation for immunization. Pt is amenable to receiving Tdap vaccine today in office but respectfully declined annual flu vaccination. VIS provided. Vaccine administered. 6. Chronic midline low back pain without sciatica  -     tiZANidine (ZANAFLEX) 4 MG tablet; Take 1/2 to 1 tablet orally at bedtime as needed for muscle relaxers, Disp-30 tablet, R-5Normal    Stable at this time. We will refill tizanidine as needed for intermittent exacerbation. 7. Generalized anxiety disorder   Continue with BuSpar twice daily as prescribed. We will consider increasing dose if symptoms worsen.     Orders Placed This Encounter   Procedures    Tdap, Nick Leahy, (age 8 yrs+), IM    [address], [address] [place] [address]     Referral Priority:   Routine     Referral Type:   Eval and Treat     Referral Reason:   Specialty Services Required     Number of Visits Requested:   1    [address]     Referral Priority:   Routine     Referral Type:   Eval and Treat     Referral Reason:   Specialty Services Required     Requested Specialty:   Cardiology     Number of Visits Requested:   1    EKG 12 Lead     Standing Status:   Future     Number of Occurrences:   1     Standing Expiration Date:   1/12/2024     Order Specific Question:   Reason for Exam?     Answer:   Irregular heart rate     Comments:   palpitation         Elements of this note have been dictated using speech recognition software. As a result, errors of speech recognition may have occurred. On this date 1/12/2023 I have spent 40 minutes reviewing previous notes, test results and face to face with the patient discussing the diagnosis and importance of compliance with the treatment plan as well as documenting on the day of the visit. Greater than 50% of this visit was spent counseling the patient about test results, prognosis, importance of compliance, education about disease process, benefits of medications, instructions for management of acute symptoms, and follow up plans. Return in about 6 months (around 7/12/2023) for Physical with fasting labs prior.      Ken Rodriguez, APRN - CNP

## 2023-01-28 PROBLEM — Z12.11 SCREENING FOR COLON CANCER: Status: RESOLVED | Noted: 2022-12-29 | Resolved: 2023-01-28

## 2023-02-15 NOTE — PROGRESS NOTES
[facility] CARDIOLOGY History & Physical                 Reason for Visit: Palpitations    Subjective:     Patient is a 41 y.o. female with a PMH of hyperlipidemia and hypertension who presents as a referral for palpitations. The patient reports that her heart \"races out of nowhere\". She reports that this has been occurring over the last few months. She reports that she drinks 12 to 16 ounces of coffee in the morning. She reports that the last year has been more stressful for her. She reports that the palpitations are sudden onset and last 30 to 45 seconds before it \"calms down\". The patient reports that she lost 80 pounds since January 2021 with diet and exercise. The patient had an ECG on January 12 that was noted to demonstrate sinus bradycardia (incorrectly read as anteroseptal infarct by the machine). Past Medical History:   Diagnosis Date    Asthma     use inhaler    GERD (gastroesophageal reflux disease)     Hypertension, essential       No past surgical history on file. Family History   Problem Relation Age of Onset    Breast Cancer Neg Hx     Depression Mother     Hypertension Mother     Diabetes Mother         DM 2      Social History     Tobacco Use    Smoking status: Never    Smokeless tobacco: Never   Substance Use Topics    Alcohol use: No      No Known Allergies      ROS:  No obvious pertinent positives on review of systems except for what was outlined above.        Objective:       /72   Pulse 60   Ht 5' 4\" (1.626 m)   Wt 189 lb (85.7 kg)   BMI 32.44 kg/m²     BP Readings from Last 3 Encounters:   02/16/23 120/72   01/12/23 122/84   07/06/22 120/82       Wt Readings from Last 3 Encounters:   02/16/23 189 lb (85.7 kg)   01/12/23 189 lb 12.8 oz (86.1 kg)   07/06/22 188 lb (85.3 kg)       General/Constitutional:   Alert and oriented x 3, no acute distress  HEENT:   normocephalic, atraumatic, moist mucous membranes  Neck:   No JVD or carotid bruits bilaterally  Cardiovascular:   regular rate and rhythm, no rub/gallop appreciated  Pulmonary:   clear to auscultation bilaterally, no respiratory distress  Abdomen:   soft, non-tender, non-distended  Ext:   No sig LE edema bilaterally  Skin:  warm and dry, no obvious rashes seen  Neuro:   no obvious sensory or motor deficits  Psychiatric:   normal mood and affect    Data Review:   Lab Results   Component Value Date    CHOL 193 01/05/2023    CHOL 194 06/28/2022    CHOL 190 12/21/2021     Lab Results   Component Value Date    TRIG 73 01/05/2023    TRIG 70 06/28/2022    TRIG 96 12/21/2021     Lab Results   Component Value Date    HDL 75 (H) 01/05/2023    HDL 78 (H) 06/28/2022    HDL 58 12/21/2021     Lab Results   Component Value Date    LDLCALC 103.4 (H) 01/05/2023    LDLCALC 102 (H) 06/28/2022    LDLCALC 115 (H) 12/21/2021     Lab Results   Component Value Date    LABVLDL 14.6 01/05/2023    LABVLDL 14 06/28/2022    LABVLDL 19 08/10/2020    VLDL 17 12/21/2021    VLDL 16 05/14/2021     Lab Results   Component Value Date    CHOLHDLRATIO 2.6 01/05/2023    CHOLHDLRATIO 2.5 06/28/2022        Lab Results   Component Value Date/Time     01/05/2023 08:22 AM     06/28/2022 09:04 AM     12/21/2021 02:37 AM    K 4.1 01/05/2023 08:22 AM    K 4.3 06/28/2022 09:04 AM    K 4.4 12/21/2021 02:37 AM     01/05/2023 08:22 AM     06/28/2022 09:04 AM     12/21/2021 02:37 AM    CO2 27 01/05/2023 08:22 AM    CO2 26 06/28/2022 09:04 AM    CO2 23 12/21/2021 02:37 AM    BUN 11 01/05/2023 08:22 AM    BUN 17 06/28/2022 09:04 AM    BUN 15 12/21/2021 02:37 AM    CREATININE 0.90 01/05/2023 08:22 AM    CREATININE 0.90 06/28/2022 09:04 AM    CREATININE 0.79 12/21/2021 02:37 AM    GLUCOSE 96 01/05/2023 08:22 AM    GLUCOSE 78 06/28/2022 09:04 AM    GLUCOSE 93 12/21/2021 02:37 AM    CALCIUM 8.9 01/05/2023 08:22 AM    CALCIUM 9.1 06/28/2022 09:04 AM    CALCIUM 9.0 12/21/2021 02:37 AM         Lab Results   Component Value Date    ALT 27 01/05/2023    ALT 27 06/28/2022    ALT 13 12/21/2021    AST 24 01/05/2023    AST 25 06/28/2022    AST 18 12/21/2021        Assessment/Plan:   1. Palpitations  - Obtain a ZIO for 3 days     2. Hypertension, unspecified type  - BP normal in clinic  - Discontinue Lopressor with a HR of 50 BPM in January 2023 on ECG  - Recommend measuring BP at home and submitting measurements to PCP's clinic  - Recommend first line antihypertensive therapy if needed for HTN, including CCB, ARB/ACEi, or thiazide diuretic     3.  Hyperlipidemia, unspecified hyperlipidemia type  - Continue with Lipitor    F/U: As needed    John Cuellar MD

## 2023-02-16 ENCOUNTER — INITIAL CONSULT (OUTPATIENT)
Dept: CARDIOLOGY CLINIC | Age: 48
End: 2023-02-16
Payer: COMMERCIAL

## 2023-02-16 VITALS
HEART RATE: 60 BPM | SYSTOLIC BLOOD PRESSURE: 120 MMHG | BODY MASS INDEX: 32.27 KG/M2 | HEIGHT: 64 IN | WEIGHT: 189 LBS | DIASTOLIC BLOOD PRESSURE: 72 MMHG

## 2023-02-16 DIAGNOSIS — E78.5 HYPERLIPIDEMIA, UNSPECIFIED HYPERLIPIDEMIA TYPE: ICD-10-CM

## 2023-02-16 DIAGNOSIS — I10 HYPERTENSION, UNSPECIFIED TYPE: ICD-10-CM

## 2023-02-16 DIAGNOSIS — R00.2 PALPITATIONS: Primary | ICD-10-CM

## 2023-02-16 PROCEDURE — 3074F SYST BP LT 130 MM HG: CPT | Performed by: INTERNAL MEDICINE

## 2023-02-16 PROCEDURE — 3078F DIAST BP <80 MM HG: CPT | Performed by: INTERNAL MEDICINE

## 2023-02-16 PROCEDURE — 99204 OFFICE O/P NEW MOD 45 MIN: CPT | Performed by: INTERNAL MEDICINE

## 2023-02-16 RX ORDER — CHLORAL HYDRATE 500 MG
CAPSULE ORAL DAILY
COMMUNITY

## 2023-02-16 RX ORDER — BACILLUS COAGULANS/INULIN 1B-250 MG
CAPSULE ORAL
COMMUNITY

## 2023-03-01 ENCOUNTER — TELEPHONE (OUTPATIENT)
Dept: CARDIOLOGY CLINIC | Age: 48
End: 2023-03-01

## 2023-03-01 NOTE — PROGRESS NOTES
[address] [place] [address] Court, [address] [facility]  [phone]    Patient Name:  Onesimo Valdez  YOB: 1975      Office Visit 3/3/2023    CHIEF COMPLAINT:    Chief Complaint   Patient presents with    New Patient     Suspect sleep apnea       HISTORY OF PRESENT ILLNESS:      The patient presents in outpatient consultation at the request of Gina Joya NP for suspected obstructive sleep apnea. Significant PMH of asthma, GERD, hypertension, and obesity. She states that the main reason she is being seen is because her primary care and cardiologist have been trying to figure out why she is having palpitations. States that she did recently wear a Holter monitor for 3 days but her cardiologist did tell her that that test came back normal.  She is due to see cardiology again in about 1 month. She reports a history of sometimes being told that she snores. States that she does seem to always wake up earlier in the morning when she would like to. She denies any excessive daytime sleepiness or fatigue. [place] score 6/24. She does report that her  has told her that she will kick and move around a lot during the night while sleeping. She denies ever having any nightmares or acting out dreams. Reports that her typical bedtime hours are from 10 PM until 6-7 AM.  States that it may take her longer than 30 minutes to go to sleep 1 time per week. She will awaken during the night 1-2 times to go to the bathroom or awaken for an unknown reason. States that she generally will go back to sleep pretty easily. She is  and has 1 daughter still living in the home and a granddaughter. They have 1 dog and 1 cat but they do not sleep in the bed with them. She states she will occasionally drink alcohol maybe 1-2 times a month. Denies any tobacco use or illicit drug use. Drinks 1 cup of coffee in the a.m. but avoids caffeine in the afternoons and evenings.   She denies ever having any pain or fidgetiness with her legs during the day or at night. She did recently have labs in January that showed a normal vitamin D and normal TSH. She denies any major medical changes over the last year. Her blood pressure is controlled today. Sleepiness Scale:   Sleep Medicine 3/3/2023 1/12/2023   Sitting and reading 2 1   Watching TV 2 1   Sitting, inactive in a public place (e.g. a theatre or a meeting) 0 0   As a passenger in a car for an hour without a break 1 0   Lying down to rest in the afternoon when circumstances permit 1 2   Sitting and talking to someone 0 0   Sitting quietly after a lunch without alcohol 0 0   In a car, while stopped for a few minutes in traffic 0 0   [place] Sleepiness Score 6 4   Neck circumference (Inches) - 12.75        Past Medical History:   Diagnosis Date    Asthma     use inhaler    GERD (gastroesophageal reflux disease)     Hypertension, essential          Patient Active Problem List   Diagnosis    Hypertension    Lichen sclerosus et atrophicus of the vulva    Gastroesophageal reflux disease without esophagitis    Pure hypercholesterolemia    Iron deficiency anemia    Palpitations    Hyperlipidemia    Suspected sleep apnea    Snoring    Periodic limb movement disorder    Persistent disorder of initiating or maintaining sleep    Obesity (BMI 30.0-34. 9)           History reviewed. No pertinent surgical history.       Social History     Socioeconomic History    Marital status:      Spouse name: Not on file    Number of children: Not on file    Years of education: Not on file    Highest education level: Not on file   Occupational History    Not on file   Tobacco Use    Smoking status: Never    Smokeless tobacco: Never   Vaping Use    Vaping Use: Never used   Substance and Sexual Activity    Alcohol use: No    Drug use: Not on file    Sexual activity: Not on file   Other Topics Concern    Not on file   Social History Narrative    Not on file     Social Determinants of Health     Financial Resource Strain: Low Risk     Difficulty of Paying Living Expenses: Not very hard   Food Insecurity: Food Insecurity Present    Worried About Running Out of Food in the Last Year: Sometimes true    Ran Out of Food in the Last Year: Never true   Transportation Needs: No Transportation Needs    Lack of Transportation (Medical): No    Lack of Transportation (Non-Medical): No   Physical Activity: Insufficiently Active    Days of Exercise per Week: 3 days    Minutes of Exercise per Session: 20 min   Stress: Stress Concern Present    Feeling of Stress : Rather much   Social Connections:  Moderately Integrated    Frequency of Communication with Friends and Family: More than three times a week    Frequency of Social Gatherings with Friends and Family: Once a week    Attends Taoism Services: 1 to 4 times per year    Active Member of [employer] or Organizations: No    Attends Club or Organization Meetings: Never    Marital Status:    Intimate Partner Violence: Not At Risk    Fear of Current or Ex-Partner: No    Emotionally Abused: No    Physically Abused: No    Sexually Abused: No   Housing Stability: High Risk    Unable to Pay for Housing in the Last Year: Yes    Number of [place] in the Last Year: 1    Unstable Housing in the Last Year: No         Family History   Problem Relation Age of Onset    Breast Cancer Neg Hx     Depression Mother     Hypertension Mother     Diabetes Mother         DM 2         No Known Allergies      Current Outpatient Medications   Medication Sig    Bacillus Coagulans-Inulin (PROBIOTIC) 1-250 BILLION-MG CAPS Take by mouth    Omega-3 Fatty Acids (FISH OIL) 1000 MG capsule Take by mouth daily    atorvastatin (LIPITOR) 40 MG tablet Take 1 tablet by mouth daily    tiZANidine (ZANAFLEX) 4 MG tablet Take 1/2 to 1 tablet orally at bedtime as needed for muscle relaxers    meloxicam (MOBIC) 7.5 MG tablet Take 1 tablet by mouth daily as needed for Pain    busPIRone (BUSPAR) 5 MG tablet TAKE 1 TABLET BY MOUTH TWICE A DAY Indications: repeated episodes of anxiety    senna-docusate (PERICOLACE) 8.6-50 MG per tablet Take 2 tablets by mouth daily For stools    pantoprazole (PROTONIX) 40 MG tablet TAKE 1 TABLET BY MOUTH EVERY DAY Indications: gastroesophageal reflux disease    albuterol sulfate HFA (PROVENTIL;VENTOLIN;PROAIR) 108 (90 Base) MCG/ACT inhaler USE 2 PUFFS EVERY 4 TO 6 HOURS AS NEEDED FOR SHORTNESS OF BREATH/WHEEZING/COUGH Indications: asthma attack    vitamin D 25 MCG (1000 UT) CAPS Take by mouth daily    clobetasol (TEMOVATE) 0.05 % ointment Use up to 2 times a day with a flair or at least 2 times a month to decrease inflammation    norethindrone-ethinyl estradiol (NORTREL 7/7/7) 0.5/0.75/1-35 MG-MCG per tablet TAKE 1 TABLET BY MOUTH EVERY DAY    SUMAtriptan (IMITREX) 50 MG tablet Take 1/2 to 1 tablet orally once daily as needed for headache     No current facility-administered medications for this visit. REVIEW OF SYSTEMS:   CONSTITUTIONAL:   There is no history of fever, chills, night sweats, weight loss, weight gain, persistent fatigue, or lethargy/hypersomnolence. EYES:   Denies problems with eye pain, erythema, blurred vision, or visual field loss. ENTM:   Denies history of tinnitus, epistaxis, sore throat, hoarseness, or dysphonia. LYMPH:   Denies swollen glands. CARDIAC:   No chest pain, pressure, discomfort, palpitations, orthopnea, murmurs, or edema. GI:   No dysphagia, heartburn reflux, nausea/vomiting, diarrhea, abdominal pain, or bleeding. :   Denies history of dysuria, hematuria, polyuria, or decreased urine output. MS:   No history of myalgias, arthralgias, bone pain, or muscle cramps. SKIN:   No history of rashes, jaundice, cyanosis, nodules, or ulcers. ENDO:   Negative for heat or cold intolerance. No history of DM. PSYCH:   Negative for anxiety, depression, insomnia, hallucinations.    NEURO:   There is no history of AMS, persistent headache, decreased level of consciousness, seizures, or motor or sensory deficits. PHYSICAL EXAM:    Vitals:    03/03/23 0859   BP: 128/87   Pulse: 71   Temp: 97.1 °F (36.2 °C)   TempSrc: Skin   SpO2: 100%   Weight: 189 lb (85.7 kg)   Height: 5' 4\" (1.626 m)     Body mass index is 32.44 kg/m². GENERAL APPEARANCE:   The patient is normal weight and in no respiratory distress. HEENT:   PERRL. Conjunctivae unremarkable. Nasal mucosa is without epistaxis, exudate, or polyps. Nares patent bilateral.  Gums and dentition are unremarkable. There is oropharyngeal narrowing. [place] score 1. NECK/LYMPHATIC:   Symmetrical with no elevation of jugular venous pulsation. Trachea midline. No thyroid enlargement. No cervical adenopathy. LUNGS:   Normal respiratory effort with symmetrical lung expansion. Breath sounds clear. HEART:   There is a regular rate and rhythm. No murmur, rub, or gallop. There is no edema in the lower extremities. ABDOMEN:   Soft and non-tender. No hepatosplenomegaly. Bowel sounds are normal.     SKIN:   There are no rashes, cyanosis, jaundice, or ecchymosis present. EXTREMITIES:   The extremities are unremarkable without clubbing, cyanosis, joint inflammation, degenerative, or ischemic change. MUSCULOSKELETAL:   There is no abnormal tone, muscle atrophy, or abnormal movement present. NEURO:   The patient is alert and oriented to person, place, and time. Memory appears intact and mood is normal.  No gross sensorimotor deficits are present. ASSESSMENT:  (Medical Decision Making)         ICD-10-CM    1. Suspected sleep apnea  R29.818 The pathophysiology of obstructive sleep apnea was reviewed with the patient. It's potential to promote severe neurologic, cardiac, pulmonary, and gastrointestinal problems was discussed.  Specifically, the increased incidence of hypertension, coronary artery disease, congestive heart failure, pulmonary hypertension, gastroesophageal reflux, pathologic hypersomnolence, memory loss, and glucose intolerance was related to the consequences of hypoxemia, hypercapnia, airway obstruction, and sympathetic overdrive. We also discussed the ability of nasal CPAP to correct these abnormalities through maintenance of a patent airway. Therapeutic options including surgery, oral appliances, and weight loss were also reviewed. If positive for sleep apnea, depending on severity, patient may choose to start CPAP therapy or want referral to dentistry for dental appliance. 2. Snoring  R06.83 Positional therapy to assure you sleep in the side-lying position and avoid sleeping supine      3. Periodic limb movement disorder  G47.61 We will reevaluate after sleep study and potentially starting treatment      4. Persistent disorder of initiating or maintaining sleep  G47.00 Mild but does have some nights, 1-2/week where she will have difficulty initiating sleep. May be due to underlying sleep apnea and nonrestorative sleep      5. Obesity (BMI 30.0-34. 9)  E66.9 Patient can lose weight including ambulating 8-10,000 steps. Can Build Up Slowly as tolerated to this goal.    Also modifying dietary intake with decrease carbohydrates and sweets. Told to use avoid the P's --> Pizza, Pasta, Pastry, etc.  Increase fruits, vegetables, and lean meats e.g. [place] chicken or game meat. Patient is aware the goal is to consume less than 2000 derek/day, since patient is a nondiabetic. We discussed using the [employer] IT to count calories. Patient can police themselves. If the future, if still having issues can use a more regimented diet e.g. [place], [address] etc. Clinical correlation suggested.                PLAN:    Home sleep study ordered  Recommendations as above  Follow-up after sleep study or after starting CPAP or after dental referral or sooner if needed         Orders Placed This Encounter   Procedures Ambulatory Referral to Sleep Studies     Referral Priority:   Routine     Referral Type:   Consult for Advice and Opinion     Referral Reason:   Specialty Services Required     Number of Visits Requested:   1                Collaborating Physician: Dr. Antonieta Camacho     Over 50% of today's office visit was spent in face to face time reviewing test results, prognosis, importance of compliance, education about disease process, benefits of medications, instructions for management of acute flare-ups, and follow up plans. Total face to face time spent with patient was 40 minutes.     [address], APRN - CNP  Electronically signed

## 2023-03-01 NOTE — TELEPHONE ENCOUNTER
Pt made aware of results. Verb understanding. Pt states she disagrees with the results. States palpitations have been worse since she took the monitor off.

## 2023-03-01 NOTE — TELEPHONE ENCOUNTER
----- Message from Jadyn Gary MD sent at 3/1/2023  2:00 PM EST -----  Please let the patient know that the patient was predominantly in sinus rhythm. The patient had rare ectopy. No new changes to medical therapy at this time.

## 2023-03-03 ENCOUNTER — OFFICE VISIT (OUTPATIENT)
Dept: SLEEP MEDICINE | Age: 48
End: 2023-03-03
Payer: COMMERCIAL

## 2023-03-03 VITALS
BODY MASS INDEX: 32.27 KG/M2 | HEIGHT: 64 IN | HEART RATE: 71 BPM | WEIGHT: 189 LBS | SYSTOLIC BLOOD PRESSURE: 128 MMHG | TEMPERATURE: 97.1 F | OXYGEN SATURATION: 100 % | DIASTOLIC BLOOD PRESSURE: 87 MMHG

## 2023-03-03 DIAGNOSIS — E66.9 OBESITY (BMI 30.0-34.9): ICD-10-CM

## 2023-03-03 DIAGNOSIS — G47.61 PERIODIC LIMB MOVEMENT DISORDER: ICD-10-CM

## 2023-03-03 DIAGNOSIS — R06.83 SNORING: ICD-10-CM

## 2023-03-03 DIAGNOSIS — G47.00 PERSISTENT DISORDER OF INITIATING OR MAINTAINING SLEEP: ICD-10-CM

## 2023-03-03 DIAGNOSIS — R29.818 SUSPECTED SLEEP APNEA: Primary | ICD-10-CM

## 2023-03-03 PROBLEM — E66.811 OBESITY (BMI 30.0-34.9): Status: ACTIVE | Noted: 2023-03-03

## 2023-03-03 PROCEDURE — 3079F DIAST BP 80-89 MM HG: CPT | Performed by: NURSE PRACTITIONER

## 2023-03-03 PROCEDURE — 3074F SYST BP LT 130 MM HG: CPT | Performed by: NURSE PRACTITIONER

## 2023-03-03 PROCEDURE — 99203 OFFICE O/P NEW LOW 30 MIN: CPT | Performed by: NURSE PRACTITIONER

## 2023-03-03 ASSESSMENT — SLEEP AND FATIGUE QUESTIONNAIRES
HOW LIKELY ARE YOU TO NOD OFF OR FALL ASLEEP WHILE SITTING AND READING: 2
HOW LIKELY ARE YOU TO NOD OFF OR FALL ASLEEP WHILE SITTING QUIETLY AFTER LUNCH WITHOUT ALCOHOL: 0
HOW LIKELY ARE YOU TO NOD OFF OR FALL ASLEEP WHILE LYING DOWN TO REST IN THE AFTERNOON WHEN CIRCUMSTANCES PERMIT: 1
ESS TOTAL SCORE: 6
HOW LIKELY ARE YOU TO NOD OFF OR FALL ASLEEP IN A CAR, WHILE STOPPED FOR A FEW MINUTES IN TRAFFIC: 0
HOW LIKELY ARE YOU TO NOD OFF OR FALL ASLEEP WHILE WATCHING TV: 2
HOW LIKELY ARE YOU TO NOD OFF OR FALL ASLEEP WHEN YOU ARE A PASSENGER IN A CAR FOR AN HOUR WITHOUT A BREAK: 1
HOW LIKELY ARE YOU TO NOD OFF OR FALL ASLEEP WHILE SITTING AND TALKING TO SOMEONE: 0
HOW LIKELY ARE YOU TO NOD OFF OR FALL ASLEEP WHILE SITTING INACTIVE IN A PUBLIC PLACE: 0

## 2023-03-03 NOTE — PATIENT INSTRUCTIONS
Home sleep study ordered  Recommendations as above  Follow-up after sleep study or after starting CPAP or after dental referral or sooner if needed

## 2023-03-07 ENCOUNTER — PREP FOR PROCEDURE (OUTPATIENT)
Dept: SURGERY | Age: 48
End: 2023-03-07

## 2023-03-08 RX ORDER — SODIUM CHLORIDE 9 MG/ML
INJECTION, SOLUTION INTRAVENOUS PRN
Status: CANCELLED | OUTPATIENT
Start: 2023-03-08

## 2023-03-08 RX ORDER — SODIUM CHLORIDE 0.9 % (FLUSH) 0.9 %
5-40 SYRINGE (ML) INJECTION EVERY 12 HOURS SCHEDULED
Status: CANCELLED | OUTPATIENT
Start: 2023-03-08

## 2023-03-08 RX ORDER — SODIUM CHLORIDE 0.9 % (FLUSH) 0.9 %
5-40 SYRINGE (ML) INJECTION PRN
Status: CANCELLED | OUTPATIENT
Start: 2023-03-08

## 2023-03-20 NOTE — PERIOP NOTE
calling [phone] if your surgery is at the [facility] or [phone] if your surgery is at the [facility].

## 2023-03-26 NOTE — PROGRESS NOTES
[facility] CARDIOLOGY Follow Up                 Reason for Visit: Palpitations    Subjective:     Patient is a 41 y.o. female with a PMH of hyperlipidemia and hypertension who presents for follow-up. The patient was last seen in February 2023. A ZIO for 3 days was ordered for palpitations. Lopressor was discontinued with a heart rate 50 bpm in January 2023. She was noted to be predominantly in sinus rhythm with rare ectopy. Her average heart rate 73 bpm.  She triggered the monitor with sinus rhythm and rare ectopy. The patient reports that she walks for 30 minutes about 3 days a week without palpitations. She reports that she drinks 16 ounces of coffee a day. However, she does report palpitations randomly such as with washing dishes. Past Medical History:   Diagnosis Date    Anxiety     Asthma     has prn inhaler- last use reported 7/2022    COVID-19 07/2022    denies hospitalization    GERD (gastroesophageal reflux disease)     protonix daily, well controlled    History of anemia 2019    corrected after 12 months oral Fe- no hx of blood transfusions    Hypertension, essential     Palpitations     Dr Aaron Aschoff    Seizure [facility]) 2011    1 episode, never happened again      No past surgical history on file. Family History   Problem Relation Age of Onset    Breast Cancer Neg Hx     Depression Mother     Hypertension Mother     Diabetes Mother         DM 2      Social History     Tobacco Use    Smoking status: Never    Smokeless tobacco: Never   Substance Use Topics    Alcohol use: Yes     Alcohol/week: 1.0 standard drink     Types: 1 Drinks containing 0.5 oz of alcohol per week      No Known Allergies      ROS:  No obvious pertinent positives on review of systems except for what was outlined above.        Objective:       /82   Pulse 72   Ht 5' 3.5\" (1.613 m)   Wt 192 lb (87.1 kg)   LMP 02/27/2023 (Within Days)   BMI 33.48 kg/m²     BP Readings from Last 3 Encounters:   03/28/23 112/82

## 2023-03-28 ENCOUNTER — OFFICE VISIT (OUTPATIENT)
Dept: CARDIOLOGY CLINIC | Age: 48
End: 2023-03-28
Payer: COMMERCIAL

## 2023-03-28 VITALS
DIASTOLIC BLOOD PRESSURE: 82 MMHG | SYSTOLIC BLOOD PRESSURE: 112 MMHG | HEART RATE: 72 BPM | HEIGHT: 64 IN | WEIGHT: 192 LBS | BODY MASS INDEX: 32.78 KG/M2

## 2023-03-28 DIAGNOSIS — R00.2 PALPITATIONS: Primary | ICD-10-CM

## 2023-03-28 PROCEDURE — 3079F DIAST BP 80-89 MM HG: CPT | Performed by: INTERNAL MEDICINE

## 2023-03-28 PROCEDURE — 99213 OFFICE O/P EST LOW 20 MIN: CPT | Performed by: INTERNAL MEDICINE

## 2023-03-28 PROCEDURE — 3074F SYST BP LT 130 MM HG: CPT | Performed by: INTERNAL MEDICINE

## 2023-04-05 ENCOUNTER — ANESTHESIA EVENT (OUTPATIENT)
Dept: ENDOSCOPY | Age: 48
End: 2023-04-05
Payer: COMMERCIAL

## 2023-04-05 NOTE — PROGRESS NOTES
RN left message with Pt to confirm appointment time of [phone], arrival time 0845, location,  requirement, and instructions for registration at the hospital.  Pt verbalized understanding.

## 2023-04-06 ENCOUNTER — ANESTHESIA (OUTPATIENT)
Dept: ENDOSCOPY | Age: 48
End: 2023-04-06
Payer: COMMERCIAL

## 2023-04-06 ENCOUNTER — HOSPITAL ENCOUNTER (OUTPATIENT)
Age: 48
Setting detail: OUTPATIENT SURGERY
Discharge: HOME | End: 2023-04-06
Attending: SURGERY | Admitting: SURGERY
Payer: COMMERCIAL

## 2023-04-06 VITALS
RESPIRATION RATE: 14 BRPM | SYSTOLIC BLOOD PRESSURE: 148 MMHG | WEIGHT: 184 LBS | TEMPERATURE: 98 F | OXYGEN SATURATION: 100 % | DIASTOLIC BLOOD PRESSURE: 80 MMHG | HEART RATE: 66 BPM | HEIGHT: 64 IN | BODY MASS INDEX: 31.41 KG/M2

## 2023-04-06 DIAGNOSIS — Z12.11 SCREENING FOR COLON CANCER: ICD-10-CM

## 2023-04-06 PROCEDURE — 3609027000 HC COLONOSCOPY: Performed by: SURGERY

## 2023-04-06 PROCEDURE — 3700000001 HC ADD 15 MINUTES (ANESTHESIA): Performed by: SURGERY

## 2023-04-06 PROCEDURE — 7100000010 HC PHASE II RECOVERY - FIRST 15 MIN: Performed by: SURGERY

## 2023-04-06 PROCEDURE — 6360000002 HC RX W HCPCS: Performed by: NURSE ANESTHETIST, CERTIFIED REGISTERED

## 2023-04-06 PROCEDURE — 2500000003 HC RX 250 WO HCPCS: Performed by: NURSE ANESTHETIST, CERTIFIED REGISTERED

## 2023-04-06 PROCEDURE — 45378 DIAGNOSTIC COLONOSCOPY: CPT | Performed by: SURGERY

## 2023-04-06 PROCEDURE — 7100000011 HC PHASE II RECOVERY - ADDTL 15 MIN: Performed by: SURGERY

## 2023-04-06 PROCEDURE — 2709999900 HC NON-CHARGEABLE SUPPLY: Performed by: SURGERY

## 2023-04-06 PROCEDURE — 3700000000 HC ANESTHESIA ATTENDED CARE: Performed by: SURGERY

## 2023-04-06 PROCEDURE — 2580000003 HC RX 258: Performed by: STUDENT IN AN ORGANIZED HEALTH CARE EDUCATION/TRAINING PROGRAM

## 2023-04-06 RX ORDER — SODIUM CHLORIDE 0.9 % (FLUSH) 0.9 %
5-40 SYRINGE (ML) INJECTION EVERY 12 HOURS SCHEDULED
Status: DISCONTINUED | OUTPATIENT
Start: 2023-04-06 | End: 2023-04-06 | Stop reason: HOSPADM

## 2023-04-06 RX ORDER — LIDOCAINE HYDROCHLORIDE 10 MG/ML
1 INJECTION, SOLUTION INFILTRATION; PERINEURAL
Status: DISCONTINUED | OUTPATIENT
Start: 2023-04-06 | End: 2023-04-06 | Stop reason: HOSPADM

## 2023-04-06 RX ORDER — SODIUM CHLORIDE 0.9 % (FLUSH) 0.9 %
5-40 SYRINGE (ML) INJECTION PRN
Status: DISCONTINUED | OUTPATIENT
Start: 2023-04-06 | End: 2023-04-06 | Stop reason: HOSPADM

## 2023-04-06 RX ORDER — SODIUM CHLORIDE 9 MG/ML
INJECTION, SOLUTION INTRAVENOUS PRN
Status: DISCONTINUED | OUTPATIENT
Start: 2023-04-06 | End: 2023-04-06 | Stop reason: HOSPADM

## 2023-04-06 RX ORDER — SODIUM CHLORIDE, SODIUM LACTATE, POTASSIUM CHLORIDE, CALCIUM CHLORIDE 600; 310; 30; 20 MG/100ML; MG/100ML; MG/100ML; MG/100ML
INJECTION, SOLUTION INTRAVENOUS CONTINUOUS
Status: DISCONTINUED | OUTPATIENT
Start: 2023-04-06 | End: 2023-04-06 | Stop reason: HOSPADM

## 2023-04-06 RX ORDER — LIDOCAINE HYDROCHLORIDE 20 MG/ML
INJECTION, SOLUTION EPIDURAL; INFILTRATION; INTRACAUDAL; PERINEURAL PRN
Status: DISCONTINUED | OUTPATIENT
Start: 2023-04-06 | End: 2023-04-06 | Stop reason: SDUPTHER

## 2023-04-06 RX ORDER — PROPOFOL 10 MG/ML
INJECTION, EMULSION INTRAVENOUS PRN
Status: DISCONTINUED | OUTPATIENT
Start: 2023-04-06 | End: 2023-04-06 | Stop reason: SDUPTHER

## 2023-04-06 RX ADMIN — SODIUM CHLORIDE, POTASSIUM CHLORIDE, SODIUM LACTATE AND CALCIUM CHLORIDE: 600; 310; 30; 20 INJECTION, SOLUTION INTRAVENOUS at 10:07

## 2023-04-06 RX ADMIN — PROPOFOL 50 MG: 10 INJECTION, EMULSION INTRAVENOUS at 10:42

## 2023-04-06 RX ADMIN — PROPOFOL 50 MG: 10 INJECTION, EMULSION INTRAVENOUS at 10:47

## 2023-04-06 RX ADMIN — LIDOCAINE HYDROCHLORIDE 100 MG: 20 INJECTION, SOLUTION EPIDURAL; INFILTRATION; INTRACAUDAL; PERINEURAL at 10:36

## 2023-04-06 RX ADMIN — PROPOFOL 50 MG: 10 INJECTION, EMULSION INTRAVENOUS at 10:54

## 2023-04-06 RX ADMIN — PROPOFOL 100 MG: 10 INJECTION, EMULSION INTRAVENOUS at 10:36

## 2023-04-06 ASSESSMENT — PAIN - FUNCTIONAL ASSESSMENT: PAIN_FUNCTIONAL_ASSESSMENT: NONE - DENIES PAIN

## 2023-04-06 NOTE — OP NOTE
Operative Note      Patient: Manisha Estrada  YOB: 1975  MRN: [id number]    [address] [facility]  [phone]    Colonoscopy Procedure Note    Name: Manisha Estrada     Date: 4/6/2023   Med Record Number: [id number]   Age: 41 y.o. Sex: female   Procedure: Colonoscopy --screening  Pre-operative Diagnosis:  Screening  Post-operative Diagnosis: tortuous colon, otherwise normal to terminal ileum  Indications: screening for colon cancer  Anesthesia/Sedation: MAC IV MAC anesthesia Propofol  Procedure Details:    Informed consent was obtained for the procedure, including sedation. Risks of perforation, hemorrhage, adverse drug reaction and aspiration were discussed. The patient was placed in the left lateral decubitus position. Based on the pre-procedure assessment, including review of the patient's medical history, medications, allergies, and review of systems, she had been deemed to be an appropriate candidate for sedation by the Anesthesia Dept. The patient was monitored continuously with ECG tracing, pulse oximetry, blood pressure monitoring, and direct observations. A time out was performed. Once sedation was adequate, a rectal examination was performed. The [id number] was inserted into the rectum and advanced under direct vision to the terminal ileum. The quality of the colonic preparation was adequate. A careful inspection was made as the colonoscope was withdrawn, including a retroflexed view of the rectum; findings and interventions are described below. Appropriate photodocumentation was obtained. Findings: ANUS: Anal exam reveals no masses or hemorrhoids, sphincter tone is normal.   RECTUM: Rectal exam reveals no masses or hemorrhoids. SIGMOID COLON: The mucosa is normal with good vascular pattern and without ulcers, diverticula, and polyps.    DESCENDING COLON: The mucosa is normal with good vascular pattern and without ulcers,

## 2023-04-06 NOTE — DISCHARGE INSTRUCTIONS
Gastrointestinal Colonoscopy/Flexible Sigmoidoscopy - Lower Exam Discharge Instructions  Call Dr. Colin Gomez at [phone] for any problems or questions. Contact the doctors office for follow up appointment as directed  Medication may cause drowsiness for several hours, therefore:  Do not drive or operate machinery for reminder of the day. No alcohol today. Do not make any important or legal decisions for 24 hours. Do not sign any legal documents for 24 hours. Ordinarily, you may resume regular diet and activity after exam unless otherwise specified by your physician. Because of air put into your colon during exam, you may experience some abdominal distension, relieved by the passage of gas, for several hours. Contact your physician if you have any of the following:  Excessive amount of bleeding - large amount when having a bowel movement.   Occasional specks of blood with bowel movement would not be unusual.  Severe abdominal pain  Fever or Chills  Any additional instructions:    - repeat colonoscopy in 10 years

## 2023-04-06 NOTE — ANESTHESIA PRE PROCEDURE
109 2023 08:22 AM    CO2 27 2023 08:22 AM    BUN 11 2023 08:22 AM    CREATININE 0.90 2023 08:22 AM    GFRAA >60 2022 09:04 AM    AGRATIO 1.7 2021 02:37 AM    LABGLOM >60 2023 08:22 AM    GLUCOSE 96 2023 08:22 AM    PROT 6.2 2023 08:22 AM    CALCIUM 8.9 2023 08:22 AM    BILITOT 0.7 2023 08:22 AM    ALKPHOS 57 2023 08:22 AM    ALKPHOS 67 2021 02:37 AM    AST 24 2023 08:22 AM    ALT 27 2023 08:22 AM       POC Tests: No results for input(s): POCGLU, POCNA, POCK, POCCL, POCBUN, POCHEMO, POCHCT in the last 72 hours. Coags: No results found for: PROTIME, INR, APTT    HCG (If Applicable): No results found for: PREGTESTUR, PREGSERUM, HCG, HCGQUANT     ABGs: No results found for: PHART, PO2ART, RVX8RXM, WLW5FQB, BEART, N1TQYRVU     Type & Screen (If Applicable):  No results found for: LABABO, LABRH    Drug/Infectious Status (If Applicable):  No results found for: HIV, HEPCAB    COVID-19 Screening (If Applicable):   Lab Results   Component Value Date/Time    COVID19 Detected 2021 02:50 PM    COVID19 Performed 2021 02:50 PM           Anesthesia Evaluation  Patient summary reviewed and Nursing notes reviewed   History of anesthetic complications: ? history of a great Aunt who  during surgery. she thinks this was anesthesia related but not sure. Airway: Mallampati: II  TM distance: >3 FB   Neck ROM: full  Mouth opening: > = 3 FB   Dental: normal exam         Pulmonary:normal exam    (+) asthma:                            Cardiovascular:  Exercise tolerance: good (>4 METS),   (+) hypertension:,                   Neuro/Psych:   Negative Neuro/Psych ROS              GI/Hepatic/Renal:   (+) GERD: well controlled,           Endo/Other:    (+) : arthritis:., .                 Abdominal:             Vascular: negative vascular ROS.          Other Findings:           Anesthesia Plan      TIVA     ASA 2       Induction:

## 2023-04-06 NOTE — ANESTHESIA POSTPROCEDURE EVALUATION
Department of Anesthesiology  Postprocedure Note    Patient: Berton Severe  MRN: [id number]  YOB: 1975  Date of evaluation: 4/6/2023      Procedure Summary     Date: 04/06/23 Room / Location: [facility] 05 / [facility] ENDOSCOPY    Anesthesia Start: 1033 Anesthesia Stop: 1107    Procedure: COLORECTAL CANCER SCREENING, NOT HIGH RISK Diagnosis:       Screening for colon cancer      (Screening for colon cancer [Z12.11])    Surgeons: Kana Murcia MD Responsible Provider: Ailyn Santana MD    Anesthesia Type: General ASA Status: 2          Anesthesia Type: General    Jannie Phase I: Jannie Score: 10    Jannie Phase II: Jannie Score: 10      Anesthesia Post Evaluation    Patient location during evaluation: bedside  Patient participation: complete - patient participated  Level of consciousness: awake and alert  Pain score: 1  Airway patency: patent  Nausea & Vomiting: no vomiting  Complications: no  Cardiovascular status: hemodynamically stable  Respiratory status: acceptable  Hydration status: euvolemic

## 2023-05-05 ENCOUNTER — OFFICE VISIT (OUTPATIENT)
Dept: CARDIOLOGY CLINIC | Age: 48
End: 2023-05-05
Payer: COMMERCIAL

## 2023-05-05 VITALS
HEIGHT: 64 IN | WEIGHT: 190 LBS | HEART RATE: 64 BPM | BODY MASS INDEX: 32.44 KG/M2 | DIASTOLIC BLOOD PRESSURE: 78 MMHG | SYSTOLIC BLOOD PRESSURE: 112 MMHG

## 2023-05-05 DIAGNOSIS — E78.5 HYPERLIPIDEMIA, UNSPECIFIED HYPERLIPIDEMIA TYPE: ICD-10-CM

## 2023-05-05 DIAGNOSIS — R00.2 PALPITATIONS: Primary | ICD-10-CM

## 2023-05-05 DIAGNOSIS — I34.0 MILD MITRAL REGURGITATION BY PRIOR ECHOCARDIOGRAM: ICD-10-CM

## 2023-05-05 PROCEDURE — 3078F DIAST BP <80 MM HG: CPT | Performed by: INTERNAL MEDICINE

## 2023-05-05 PROCEDURE — 99214 OFFICE O/P EST MOD 30 MIN: CPT | Performed by: INTERNAL MEDICINE

## 2023-05-05 PROCEDURE — 3074F SYST BP LT 130 MM HG: CPT | Performed by: INTERNAL MEDICINE

## 2023-05-05 NOTE — PROGRESS NOTES
AM    CREATININE 0.79 12/21/2021 02:37 AM    GLUCOSE 96 01/05/2023 08:22 AM    GLUCOSE 78 06/28/2022 09:04 AM    GLUCOSE 93 12/21/2021 02:37 AM    CALCIUM 8.9 01/05/2023 08:22 AM    CALCIUM 9.1 06/28/2022 09:04 AM    CALCIUM 9.0 12/21/2021 02:37 AM         Lab Results   Component Value Date    ALT 27 01/05/2023    ALT 27 06/28/2022    ALT 13 12/21/2021    AST 24 01/05/2023    AST 25 06/28/2022    AST 18 12/21/2021        Assessment/Plan:   1. Palpitations  - Minimally symptomatic and well tolerated   - Ambulatory ECG monitor in February 2023 noted predominantly sinus rhythm and rare ectopy  - No further cardiac testing indicated at this time    2. Hyperlipidemia, unspecified hyperlipidemia type  - The 10-year ASCVD risk score (Vanda DK, et al., 2019) is: 0.4%  - PCP note reviewed  - Currently on Lipitor    3.  Mild mitral regurgitation by prior echocardiogram  - Surveillance echocardiogram in 3 to 5 years  - Will defer to PCP to obtain the echocardiogram at the aforementioned recommended time window with follow-up with us as needed if there is progression (message sent to PCP)     F/U: As needed    Eliza Delgado MD

## 2023-05-09 ENCOUNTER — HOSPITAL ENCOUNTER (OUTPATIENT)
Dept: SLEEP CENTER | Age: 48
Discharge: HOME | End: 2023-05-12
Payer: COMMERCIAL

## 2023-05-09 PROCEDURE — 95806 SLEEP STUDY UNATT&RESP EFFT: CPT

## 2023-07-05 DIAGNOSIS — E55.9 VITAMIN D DEFICIENCY: ICD-10-CM

## 2023-07-05 DIAGNOSIS — Z11.59 NEED FOR HEPATITIS C SCREENING TEST: ICD-10-CM

## 2023-07-05 DIAGNOSIS — Z00.00 LABORATORY EXAMINATION ORDERED AS PART OF A COMPLETE PHYSICAL EXAMINATION: ICD-10-CM

## 2023-07-05 DIAGNOSIS — Z11.4 ENCOUNTER FOR SCREENING FOR HIV: ICD-10-CM

## 2023-07-05 DIAGNOSIS — Z11.59 NEED FOR HEPATITIS C SCREENING TEST: Primary | ICD-10-CM

## 2023-07-05 LAB
25(OH)D3 SERPL-MCNC: 57 NG/ML (ref 30–100)
ALBUMIN SERPL-MCNC: 3.1 G/DL (ref 3.5–5)
ALBUMIN/GLOB SERPL: 0.9 (ref 0.4–1.6)
ALP SERPL-CCNC: 59 U/L (ref 50–136)
ALT SERPL-CCNC: 16 U/L (ref 12–65)
ANION GAP SERPL CALC-SCNC: 5 MMOL/L (ref 2–11)
APPEARANCE UR: CLEAR
AST SERPL-CCNC: 15 U/L (ref 15–37)
BACTERIA URNS QL MICRO: NEGATIVE /HPF
BASOPHILS # BLD: 0 K/UL (ref 0–0.2)
BASOPHILS NFR BLD: 1 % (ref 0–2)
BILIRUB SERPL-MCNC: 0.3 MG/DL (ref 0.2–1.1)
BILIRUB UR QL: NEGATIVE
BUN SERPL-MCNC: 13 MG/DL (ref 6–23)
CALCIUM SERPL-MCNC: 8.8 MG/DL (ref 8.3–10.4)
CASTS URNS QL MICRO: ABNORMAL /LPF (ref 0–2)
CHLORIDE SERPL-SCNC: 109 MMOL/L (ref 101–110)
CHOLEST SERPL-MCNC: 194 MG/DL
CO2 SERPL-SCNC: 27 MMOL/L (ref 21–32)
COLOR UR: ABNORMAL
CREAT SERPL-MCNC: 0.9 MG/DL (ref 0.6–1)
DIFFERENTIAL METHOD BLD: NORMAL
EOSINOPHIL # BLD: 0.3 K/UL (ref 0–0.8)
EOSINOPHIL NFR BLD: 5 % (ref 0.5–7.8)
EPI CELLS #/AREA URNS HPF: ABNORMAL /HPF (ref 0–5)
ERYTHROCYTE [DISTWIDTH] IN BLOOD BY AUTOMATED COUNT: 12.4 % (ref 11.9–14.6)
GLOBULIN SER CALC-MCNC: 3.3 G/DL (ref 2.8–4.5)
GLUCOSE SERPL-MCNC: 88 MG/DL (ref 65–100)
GLUCOSE UR STRIP.AUTO-MCNC: NEGATIVE MG/DL
HCT VFR BLD AUTO: 42.2 % (ref 35.8–46.3)
HCV AB SER QL: NONREACTIVE
HDLC SERPL-MCNC: 85 MG/DL (ref 40–60)
HDLC SERPL: 2.3
HGB BLD-MCNC: 13.5 G/DL (ref 11.7–15.4)
HGB UR QL STRIP: NEGATIVE
HIV 1+2 AB+HIV1 P24 AG SERPL QL IA: NONREACTIVE
HIV 1/2 RESULT COMMENT: NORMAL
IMM GRANULOCYTES # BLD AUTO: 0 K/UL (ref 0–0.5)
IMM GRANULOCYTES NFR BLD AUTO: 0 % (ref 0–5)
KETONES UR QL STRIP.AUTO: NEGATIVE MG/DL
LDLC SERPL CALC-MCNC: 95.4 MG/DL
LEUKOCYTE ESTERASE UR QL STRIP.AUTO: ABNORMAL
LYMPHOCYTES # BLD: 3.4 K/UL (ref 0.5–4.6)
LYMPHOCYTES NFR BLD: 44 % (ref 13–44)
MCH RBC QN AUTO: 31.4 PG (ref 26.1–32.9)
MCHC RBC AUTO-ENTMCNC: 32 G/DL (ref 31.4–35)
MCV RBC AUTO: 98.1 FL (ref 82–102)
MONOCYTES # BLD: 0.5 K/UL (ref 0.1–1.3)
MONOCYTES NFR BLD: 6 % (ref 4–12)
MUCOUS THREADS URNS QL MICRO: 0 /LPF
NEUTS SEG # BLD: 3.4 K/UL (ref 1.7–8.2)
NEUTS SEG NFR BLD: 44 % (ref 43–78)
NITRITE UR QL STRIP.AUTO: NEGATIVE
NRBC # BLD: 0 K/UL (ref 0–0.2)
PH UR STRIP: 6.5 (ref 5–9)
PLATELET # BLD AUTO: 301 K/UL (ref 150–450)
PMV BLD AUTO: 9.9 FL (ref 9.4–12.3)
POTASSIUM SERPL-SCNC: 4.1 MMOL/L (ref 3.5–5.1)
PROT SERPL-MCNC: 6.4 G/DL (ref 6.3–8.2)
PROT UR STRIP-MCNC: NEGATIVE MG/DL
RBC # BLD AUTO: 4.3 M/UL (ref 4.05–5.2)
RBC #/AREA URNS HPF: ABNORMAL /HPF (ref 0–5)
SODIUM SERPL-SCNC: 141 MMOL/L (ref 133–143)
SP GR UR REFRACTOMETRY: 1.01 (ref 1–1.02)
TRIGL SERPL-MCNC: 68 MG/DL (ref 35–150)
TSH W FREE THYROID IF ABNORMAL: 2.5 UIU/ML (ref 0.36–3.74)
URINE CULTURE IF INDICATED: ABNORMAL
UROBILINOGEN UR QL STRIP.AUTO: 0.2 EU/DL (ref 0.2–1)
VLDLC SERPL CALC-MCNC: 13.6 MG/DL (ref 6–23)
WBC # BLD AUTO: 7.6 K/UL (ref 4.3–11.1)
WBC URNS QL MICRO: ABNORMAL /HPF (ref 0–4)

## 2023-07-05 ASSESSMENT — PATIENT HEALTH QUESTIONNAIRE - PHQ9
SUM OF ALL RESPONSES TO PHQ9 QUESTIONS 1 & 2: 0
SUM OF ALL RESPONSES TO PHQ QUESTIONS 1-9: 0
SUM OF ALL RESPONSES TO PHQ9 QUESTIONS 1 & 2: 0
SUM OF ALL RESPONSES TO PHQ QUESTIONS 1-9: 0
SUM OF ALL RESPONSES TO PHQ QUESTIONS 1-9: 0
2. FEELING DOWN, DEPRESSED OR HOPELESS: 0
2. FEELING DOWN, DEPRESSED OR HOPELESS: NOT AT ALL
1. LITTLE INTEREST OR PLEASURE IN DOING THINGS: NOT AT ALL
1. LITTLE INTEREST OR PLEASURE IN DOING THINGS: 0
SUM OF ALL RESPONSES TO PHQ QUESTIONS 1-9: 0

## 2023-07-12 ENCOUNTER — OFFICE VISIT (OUTPATIENT)
Dept: FAMILY MEDICINE CLINIC | Facility: CLINIC | Age: 48
End: 2023-07-12
Payer: COMMERCIAL

## 2023-07-12 VITALS
DIASTOLIC BLOOD PRESSURE: 82 MMHG | HEIGHT: 64 IN | BODY MASS INDEX: 32.85 KG/M2 | SYSTOLIC BLOOD PRESSURE: 120 MMHG | WEIGHT: 192.4 LBS | OXYGEN SATURATION: 97 % | HEART RATE: 62 BPM

## 2023-07-12 DIAGNOSIS — M54.50 CHRONIC MIDLINE LOW BACK PAIN WITHOUT SCIATICA: ICD-10-CM

## 2023-07-12 DIAGNOSIS — I83.892 VARICOSE VEINS OF LEFT LOWER EXTREMITY WITH EDEMA: ICD-10-CM

## 2023-07-12 DIAGNOSIS — F41.1 GENERALIZED ANXIETY DISORDER: ICD-10-CM

## 2023-07-12 DIAGNOSIS — Z00.00 ROUTINE GENERAL MEDICAL EXAMINATION AT A HEALTH CARE FACILITY: Primary | ICD-10-CM

## 2023-07-12 DIAGNOSIS — E55.9 VITAMIN D DEFICIENCY: ICD-10-CM

## 2023-07-12 DIAGNOSIS — I10 ESSENTIAL HYPERTENSION: ICD-10-CM

## 2023-07-12 DIAGNOSIS — E78.00 PURE HYPERCHOLESTEROLEMIA: ICD-10-CM

## 2023-07-12 DIAGNOSIS — K21.9 GASTROESOPHAGEAL REFLUX DISEASE WITHOUT ESOPHAGITIS: ICD-10-CM

## 2023-07-12 DIAGNOSIS — G89.29 CHRONIC MIDLINE LOW BACK PAIN WITHOUT SCIATICA: ICD-10-CM

## 2023-07-12 PROCEDURE — 99396 PREV VISIT EST AGE 40-64: CPT | Performed by: NURSE PRACTITIONER

## 2023-07-12 PROCEDURE — 3074F SYST BP LT 130 MM HG: CPT | Performed by: NURSE PRACTITIONER

## 2023-07-12 PROCEDURE — 3079F DIAST BP 80-89 MM HG: CPT | Performed by: NURSE PRACTITIONER

## 2023-07-12 SDOH — ECONOMIC STABILITY: FOOD INSECURITY: WITHIN THE PAST 12 MONTHS, THE FOOD YOU BOUGHT JUST DIDN'T LAST AND YOU DIDN'T HAVE MONEY TO GET MORE.: NEVER TRUE

## 2023-07-12 SDOH — ECONOMIC STABILITY: INCOME INSECURITY: HOW HARD IS IT FOR YOU TO PAY FOR THE VERY BASICS LIKE FOOD, HOUSING, MEDICAL CARE, AND HEATING?: NOT HARD AT ALL

## 2023-07-12 SDOH — ECONOMIC STABILITY: FOOD INSECURITY: WITHIN THE PAST 12 MONTHS, YOU WORRIED THAT YOUR FOOD WOULD RUN OUT BEFORE YOU GOT MONEY TO BUY MORE.: NEVER TRUE

## 2023-07-12 ASSESSMENT — ENCOUNTER SYMPTOMS
ABDOMINAL DISTENTION: 0
VOMITING: 0
NAUSEA: 0
EYE DISCHARGE: 0
EYE ITCHING: 0
GASTROINTESTINAL NEGATIVE: 1
TROUBLE SWALLOWING: 0
CONSTIPATION: 0
SHORTNESS OF BREATH: 0
CHEST TIGHTNESS: 0
CHOKING: 0
RECTAL PAIN: 0
EYES NEGATIVE: 1
RESPIRATORY NEGATIVE: 1
ALLERGIC/IMMUNOLOGIC NEGATIVE: 1
COLOR CHANGE: 0
ABDOMINAL PAIN: 0
RHINORRHEA: 0
VOICE CHANGE: 0
SORE THROAT: 0
APNEA: 0
BLOOD IN STOOL: 0
ANAL BLEEDING: 0
WHEEZING: 0
EYE REDNESS: 0
EYE PAIN: 0
STRIDOR: 0
FACIAL SWELLING: 0
BACK PAIN: 0
SINUS PAIN: 0
COUGH: 0
DIARRHEA: 0
SINUS PRESSURE: 0
PHOTOPHOBIA: 0

## 2023-07-12 NOTE — PROGRESS NOTES
PROGRESS NOTE    Chief Complaint   Patient presents with    Annual Exam       SUBJECTIVE:     Reinaldo Jj is a very pleasant 41 y.o. female with hx of  anxiety, GERD, hyperlipidemia, hypertension and migraine, seen today in office for follow up for lab results review. She is due for her annual physical.  She reports doing well overall. Still has intermittent palpitation but overall has significantly improved. She reports her knee discomfort has also is stable and doing well. She continues to do dietary and exercise modification and continue with her weight loss. She is a non-smoker. No alcohol use. No illicit drug use. She currently is established with GYN for her GYN health. She is scheduled to have a mammogram in 1 month. She is also up-to-date with her colon cancer screening. Patient reports no chest pain, no shortness of breath, no unilateral or focal weakness, no orthopnea or PND. GI and  review of systems is unremarkable. Past Medical History, Past Surgical History, Family history, Social History, and Medications were all reviewed with the patient today and updated as necessary.        Current Outpatient Medications   Medication Sig Dispense Refill    Bacillus Coagulans-Inulin (PROBIOTIC) 1-250 BILLION-MG CAPS Take by mouth      Omega-3 Fatty Acids (FISH OIL) 1000 MG capsule Take by mouth daily      atorvastatin (LIPITOR) 40 MG tablet Take 1 tablet by mouth daily (Patient taking differently: Take 1 tablet by mouth every evening) 90 tablet 3    tiZANidine (ZANAFLEX) 4 MG tablet Take 1/2 to 1 tablet orally at bedtime as needed for muscle relaxers 30 tablet 5    meloxicam (MOBIC) 7.5 MG tablet Take 1 tablet by mouth daily as needed for Pain 30 tablet 5    busPIRone (BUSPAR) 5 MG tablet TAKE 1 TABLET BY MOUTH TWICE A DAY Indications: repeated episodes of anxiety 180 tablet 3    senna-docusate (PERICOLACE) 8.6-50 MG per tablet Take 2 tablets by mouth daily For stools 180 tablet 3

## 2023-08-11 ENCOUNTER — PATIENT MESSAGE (OUTPATIENT)
Dept: FAMILY MEDICINE CLINIC | Facility: CLINIC | Age: 48
End: 2023-08-11

## 2023-08-11 DIAGNOSIS — Z12.83 ENCOUNTER FOR SCREENING FOR MALIGNANT NEOPLASM OF SKIN: Primary | ICD-10-CM

## 2023-08-12 NOTE — TELEPHONE ENCOUNTER
From: Hank Mccurdy  To: Alisa Henriquez  Sent: 8/11/2023 11:24 AM EDT  Subject: Dermatologist referral    I would like to have a skin cancer scan due to family history and wondered if you could do a dermatologist referral.   Thanks!   Stacy Lou

## 2023-08-29 ENCOUNTER — APPOINTMENT (RX ONLY)
Dept: URBAN - METROPOLITAN AREA CLINIC 330 | Facility: CLINIC | Age: 48
Setting detail: DERMATOLOGY
End: 2023-08-29

## 2023-08-29 DIAGNOSIS — L82.1 OTHER SEBORRHEIC KERATOSIS: ICD-10-CM

## 2023-08-29 DIAGNOSIS — I83.9 ASYMPTOMATIC VARICOSE VEINS OF LOWER EXTREMITIES: ICD-10-CM | Status: UNCHANGED

## 2023-08-29 DIAGNOSIS — D22 MELANOCYTIC NEVI: ICD-10-CM

## 2023-08-29 DIAGNOSIS — L81.4 OTHER MELANIN HYPERPIGMENTATION: ICD-10-CM

## 2023-08-29 DIAGNOSIS — L57.8 OTHER SKIN CHANGES DUE TO CHRONIC EXPOSURE TO NONIONIZING RADIATION: ICD-10-CM | Status: STABLE

## 2023-08-29 PROBLEM — I83.93 ASYMPTOMATIC VARICOSE VEINS OF BILATERAL LOWER EXTREMITIES: Status: ACTIVE | Noted: 2023-08-29

## 2023-08-29 PROBLEM — D22.5 MELANOCYTIC NEVI OF TRUNK: Status: ACTIVE | Noted: 2023-08-29

## 2023-08-29 PROCEDURE — ? TREATMENT REGIMEN

## 2023-08-29 PROCEDURE — ? SUNSCREEN TREATMENT REGIMEN

## 2023-08-29 PROCEDURE — ? FULL BODY SKIN EXAM

## 2023-08-29 PROCEDURE — 99203 OFFICE O/P NEW LOW 30 MIN: CPT

## 2023-08-29 PROCEDURE — ? COUNSELING

## 2023-08-29 ASSESSMENT — LOCATION ZONE DERM
LOCATION ZONE: TRUNK
LOCATION ZONE: LEG

## 2023-08-29 ASSESSMENT — LOCATION SIMPLE DESCRIPTION DERM
LOCATION SIMPLE: RIGHT UPPER BACK
LOCATION SIMPLE: RIGHT CALF
LOCATION SIMPLE: LEFT CALF
LOCATION SIMPLE: LEFT UPPER BACK

## 2023-08-29 ASSESSMENT — LOCATION DETAILED DESCRIPTION DERM
LOCATION DETAILED: LEFT MID-UPPER BACK
LOCATION DETAILED: LEFT INFERIOR UPPER BACK
LOCATION DETAILED: RIGHT DISTAL CALF
LOCATION DETAILED: RIGHT MEDIAL UPPER BACK
LOCATION DETAILED: LEFT DISTAL CALF

## 2023-09-06 ENCOUNTER — HOSPITAL ENCOUNTER (OUTPATIENT)
Dept: MAMMOGRAPHY | Age: 48
Discharge: HOME | End: 2023-09-09

## 2023-09-06 DIAGNOSIS — Z12.31 SCREENING MAMMOGRAM FOR HIGH-RISK PATIENT: ICD-10-CM

## 2024-02-26 ENCOUNTER — TELEMEDICINE (OUTPATIENT)
Dept: FAMILY MEDICINE CLINIC | Facility: CLINIC | Age: 49
End: 2024-02-26
Payer: COMMERCIAL

## 2024-02-26 DIAGNOSIS — B96.89 ACUTE BACTERIAL SINUSITIS: Primary | ICD-10-CM

## 2024-02-26 DIAGNOSIS — R05.1 ACUTE COUGH: ICD-10-CM

## 2024-02-26 DIAGNOSIS — J01.90 ACUTE BACTERIAL SINUSITIS: Primary | ICD-10-CM

## 2024-02-26 PROCEDURE — 99214 OFFICE O/P EST MOD 30 MIN: CPT | Performed by: NURSE PRACTITIONER

## 2024-02-26 RX ORDER — PANTOPRAZOLE SODIUM 40 MG/1
TABLET, DELAYED RELEASE ORAL
Qty: 90 TABLET | Refills: 3 | Status: SHIPPED | OUTPATIENT
Start: 2024-02-26

## 2024-02-26 RX ORDER — BENZONATATE 200 MG/1
200 CAPSULE ORAL 3 TIMES DAILY PRN
Qty: 30 CAPSULE | Refills: 0 | Status: SHIPPED | OUTPATIENT
Start: 2024-02-26

## 2024-02-26 RX ORDER — CEFDINIR 300 MG/1
300 CAPSULE ORAL 2 TIMES DAILY
Qty: 20 CAPSULE | Refills: 0 | Status: SHIPPED | OUTPATIENT
Start: 2024-02-26 | End: 2024-03-07

## 2024-02-26 RX ORDER — ATORVASTATIN CALCIUM 40 MG/1
40 TABLET, FILM COATED ORAL EVERY EVENING
Qty: 90 TABLET | Refills: 3 | Status: SHIPPED | OUTPATIENT
Start: 2024-02-26

## 2024-02-26 RX ORDER — BUSPIRONE HYDROCHLORIDE 5 MG/1
TABLET ORAL
Qty: 180 TABLET | Refills: 3 | Status: SHIPPED | OUTPATIENT
Start: 2024-02-26

## 2024-02-26 ASSESSMENT — ENCOUNTER SYMPTOMS
COLOR CHANGE: 0
EYE REDNESS: 0
SINUS PAIN: 1
FACIAL SWELLING: 0
GASTROINTESTINAL NEGATIVE: 1
COUGH: 1
APNEA: 0
VOICE CHANGE: 0
NAUSEA: 0
TROUBLE SWALLOWING: 0
ALLERGIC/IMMUNOLOGIC NEGATIVE: 1
SINUS PRESSURE: 1
SORE THROAT: 1
VOMITING: 0
PHOTOPHOBIA: 0
CHEST TIGHTNESS: 1
RECTAL PAIN: 0
ABDOMINAL DISTENTION: 0
CONSTIPATION: 0
BLOOD IN STOOL: 0
RHINORRHEA: 0
EYE DISCHARGE: 0
WHEEZING: 0
STRIDOR: 0
BACK PAIN: 0
EYE ITCHING: 0
CHOKING: 0
EYES NEGATIVE: 1
ANAL BLEEDING: 0
DIARRHEA: 0
SHORTNESS OF BREATH: 0
EYE PAIN: 0
ABDOMINAL PAIN: 0

## 2024-02-26 ASSESSMENT — PATIENT HEALTH QUESTIONNAIRE - PHQ9
SUM OF ALL RESPONSES TO PHQ9 QUESTIONS 1 & 2: 0
1. LITTLE INTEREST OR PLEASURE IN DOING THINGS: 0
SUM OF ALL RESPONSES TO PHQ QUESTIONS 1-9: 0
2. FEELING DOWN, DEPRESSED OR HOPELESS: 0

## 2024-02-26 NOTE — PROGRESS NOTES
PROGRESS NOTE        Consent:      Celestina Joseph was evaluated through a synchronous (real-time) audio encounter. Patient identification was verified at the start of the visit. She (or guardian if applicable) is aware that this is a billable service, which includes applicable co-pays. This visit was conducted with the patient's (and/or legal guardian's) verbal consent. She has not had a related appointment within my department in the past 7 days or scheduled within the next 24 hours.   The patient was located in [place]  The provider was located in [place]    Note: not billable if this call serves to triage the patient into an appointment for the relevant concern     On this date 2/26/2024 I have spent 30 minutes reviewing previous notes, test results and face to face (virtual) with the patient discussing the diagnosis and importance of compliance with the treatment plan as well as documenting on the day of the visit.. Greater than 50% of this visit was spent counseling the patient about test results, prognosis, importance of compliance, education about disease process, benefits of medications, instructions for management of acute symptoms, and follow up plans.      Chief Complaint   Patient presents with    Other     Sx  of flu.     Cough    Congestion       SUBJECTIVE:     Celestina Joseph is a very pleasant 48 y.o. female , with past medical history significant for  anxiety, GERD, hyperlipidemia, hypertension and migraine, seen virtually regarding chills, body aches, cough and congestion.  She had a negative for home covid test.  Symptoms started about 2 to 3 days ago.  She had chills but no fever.  Has tried OTC regimen with minimal relief.  She reports no chest pain, no shortness of breath, palpitation, no unilateral focal weakness, no abdominal pain no nausea, no vomiting no diarrhea no incontinence of bladder or bowel function.      Past Medical History, Past Surgical History, Family history, Social History, and

## 2024-03-07 NOTE — PROGRESS NOTES
[facility] CARDIOLOGY Follow Up                 Reason for Visit: Palpitations    Subjective:     Patient is a 48 y.o. female with a PMH of hyperlipidemia, hyperlipidemia, and asthma who presents for follow-up.  The patient was last seen in May 2023.  It was noted that she had palpitations that were minimally symptomatic and well-tolerated with an ambulatory ECG monitor in February 2023 noted predominantly sinus rhythm and rare ectopy.  The patient had a TTE in April 2023 that was noted to demonstrate a normal EF and mild MR. She denies chest pain or shortness of breath.  She does report a sensation of her heart beating, \"pounding\", including at the time of doing her ECG today in clinic.  She reports that she just finished a 10-day course of antibiotics yesterday for a URI.    Past Medical History:   Diagnosis Date    Anxiety     Asthma     has prn inhaler- last use reported 7/2022    Chronic back pain 2008    COVID-19 07/2022    denies hospitalization    GERD (gastroesophageal reflux disease)     protonix daily, well controlled    History of anemia 2019    corrected after 12 months oral Fe- no hx of blood transfusions    Hypertension, essential     Palpitations     Dr Horta [facility] Card    Seizure (HCC) 2011    1 episode, never happened again      Past Surgical History:   Procedure Laterality Date    COLONOSCOPY N/A 4/6/2023    COLORECTAL CANCER SCREENING, NOT HIGH RISK performed by Wiley Scruggs MD at [facility] ENDOSCOPY      Family History   Problem Relation Age of Onset    Depression Mother     Hypertension Mother     Diabetes Mother         DM 2    High Blood Pressure Mother     Miscarriages / Stillbirths Mother     Obesity Mother     Cancer Maternal Grandfather         skin cancer    Coronary Art Dis Maternal Grandfather     Gout Maternal Grandfather     Heart Disease Maternal Grandfather     High Blood Pressure Maternal Grandfather     Coronary Art Dis Maternal Grandmother     Heart Attack Maternal Grandmother

## 2024-03-08 ENCOUNTER — OFFICE VISIT (OUTPATIENT)
Age: 49
End: 2024-03-08
Payer: COMMERCIAL

## 2024-03-08 VITALS
DIASTOLIC BLOOD PRESSURE: 82 MMHG | HEART RATE: 60 BPM | WEIGHT: 202.6 LBS | HEIGHT: 64 IN | SYSTOLIC BLOOD PRESSURE: 122 MMHG | BODY MASS INDEX: 34.59 KG/M2

## 2024-03-08 DIAGNOSIS — R00.2 PALPITATIONS: Primary | ICD-10-CM

## 2024-03-08 DIAGNOSIS — I34.0 MILD MITRAL REGURGITATION BY PRIOR ECHOCARDIOGRAM: ICD-10-CM

## 2024-03-08 DIAGNOSIS — E78.5 HYPERLIPIDEMIA, UNSPECIFIED HYPERLIPIDEMIA TYPE: ICD-10-CM

## 2024-03-08 PROCEDURE — 93000 ELECTROCARDIOGRAM COMPLETE: CPT | Performed by: INTERNAL MEDICINE

## 2024-03-08 PROCEDURE — 3079F DIAST BP 80-89 MM HG: CPT | Performed by: INTERNAL MEDICINE

## 2024-03-08 PROCEDURE — 3074F SYST BP LT 130 MM HG: CPT | Performed by: INTERNAL MEDICINE

## 2024-03-08 PROCEDURE — 99214 OFFICE O/P EST MOD 30 MIN: CPT | Performed by: INTERNAL MEDICINE

## 2024-04-18 ENCOUNTER — HOSPITAL ENCOUNTER (OUTPATIENT)
Dept: GENERAL RADIOLOGY | Age: 49
Discharge: HOME | End: 2024-04-21

## 2024-04-18 ENCOUNTER — TELEMEDICINE (OUTPATIENT)
Dept: FAMILY MEDICINE CLINIC | Facility: CLINIC | Age: 49
End: 2024-04-18
Payer: COMMERCIAL

## 2024-04-18 DIAGNOSIS — M79.601 PAIN IN RIGHT ARM: ICD-10-CM

## 2024-04-18 DIAGNOSIS — M79.641 PAIN IN RIGHT HAND: ICD-10-CM

## 2024-04-18 DIAGNOSIS — M25.531 PAIN IN RIGHT WRIST: ICD-10-CM

## 2024-04-18 DIAGNOSIS — M25.531 PAIN IN RIGHT WRIST: Primary | ICD-10-CM

## 2024-04-18 PROCEDURE — 99214 OFFICE O/P EST MOD 30 MIN: CPT | Performed by: NURSE PRACTITIONER

## 2024-04-18 RX ORDER — MELOXICAM 7.5 MG/1
7.5 TABLET ORAL 2 TIMES DAILY PRN
Qty: 60 TABLET | Refills: 0 | Status: SHIPPED | OUTPATIENT
Start: 2024-04-18

## 2024-04-18 NOTE — PROGRESS NOTES
PROGRESS NOTE        Consent:    Celestina Joseph, was evaluated through a synchronous (real-time) audio-video encounter. The patient (or guardian if applicable) is aware that this is a billable service, which includes applicable co-pays. This Virtual Visit was conducted with patient's (and/or legal guardian's) consent. Patient identification was verified, and a caregiver was present when appropriate.   [place] located in [place]  [place]  Confirm you are appropriately licensed, registered, or certified to deliver care in the state where the patient is located as indicated above. If you are not or unsure, please re-schedule the visit: Yes, I confirm.        On this date 4/18/2024 I have spent 31 minutes reviewing previous notes, test results and face to face (virtual) with the patient discussing the diagnosis and importance of compliance with the treatment plan as well as documenting on the day of the visit.. Greater than 50% of this visit was spent counseling the patient about test results, prognosis, importance of compliance, education about disease process, benefits of medications, instructions for management of acute symptoms, and follow up plans.      Chief Complaint   Patient presents with    Arm Pain     Walking the dog and he jerked the leash causing pain in the right wrist and elbow 2 weeks ago.       SUBJECTIVE:     Celestina Joseph is a very pleasant 48 y.o. female , with past medical history significant for anxiety, GERD, hyperlipidemia, hypertension and migraine, seen virtually regarding complaints of having right forearm and wrist as well as elbow discomfort that started about 2 weeks ago.  She was walking her dog but he ran and jerked the leash forward causing a sharp stabbing pain.  Since then, she has had persistent discomfort.  She reports the discomfort was improving but returned particularly at her wrists.  She reports no erythema and no open wound or lesions      Past Medical

## 2024-04-27 ASSESSMENT — ENCOUNTER SYMPTOMS
APNEA: 0
EYE ITCHING: 0
EYES NEGATIVE: 1
SORE THROAT: 0
VOMITING: 0
DIARRHEA: 0
BLOOD IN STOOL: 0
VOICE CHANGE: 0
PHOTOPHOBIA: 0
STRIDOR: 0
ALLERGIC/IMMUNOLOGIC NEGATIVE: 1
ABDOMINAL PAIN: 0
WHEEZING: 0
ANAL BLEEDING: 0
EYE DISCHARGE: 0
NAUSEA: 0
BACK PAIN: 0
COLOR CHANGE: 0
FACIAL SWELLING: 0
COUGH: 0
RESPIRATORY NEGATIVE: 1
SINUS PRESSURE: 0
ABDOMINAL DISTENTION: 0
SINUS PAIN: 0
EYE REDNESS: 0
CHOKING: 0
SHORTNESS OF BREATH: 0
RECTAL PAIN: 0
GASTROINTESTINAL NEGATIVE: 1
EYE PAIN: 0
CONSTIPATION: 0
CHEST TIGHTNESS: 0
RHINORRHEA: 0
TROUBLE SWALLOWING: 0

## 2024-05-15 DIAGNOSIS — M25.531 PAIN IN RIGHT WRIST: ICD-10-CM

## 2024-05-15 RX ORDER — MELOXICAM 7.5 MG/1
7.5 TABLET ORAL 2 TIMES DAILY PRN
Qty: 60 TABLET | Refills: 0 | Status: SHIPPED | OUTPATIENT
Start: 2024-05-15

## 2024-07-08 ENCOUNTER — NURSE ONLY (OUTPATIENT)
Dept: FAMILY MEDICINE CLINIC | Facility: CLINIC | Age: 49
End: 2024-07-08
Payer: COMMERCIAL

## 2024-07-08 DIAGNOSIS — I10 ESSENTIAL HYPERTENSION: ICD-10-CM

## 2024-07-08 DIAGNOSIS — E55.9 VITAMIN D DEFICIENCY: ICD-10-CM

## 2024-07-08 DIAGNOSIS — Z00.00 ROUTINE GENERAL MEDICAL EXAMINATION AT A HEALTH CARE FACILITY: ICD-10-CM

## 2024-07-08 DIAGNOSIS — E78.00 PURE HYPERCHOLESTEROLEMIA: ICD-10-CM

## 2024-07-08 LAB
25(OH)D3 SERPL-MCNC: 65.4 NG/ML (ref 30–100)
ALBUMIN SERPL-MCNC: 3 G/DL (ref 3.5–5)
ALBUMIN/GLOB SERPL: 0.9 (ref 1–1.9)
ALP SERPL-CCNC: 61 U/L (ref 35–104)
ALT SERPL-CCNC: 13 U/L (ref 12–65)
ANION GAP SERPL CALC-SCNC: 9 MMOL/L (ref 9–18)
AST SERPL-CCNC: 23 U/L (ref 15–37)
BASOPHILS # BLD: 0.1 K/UL (ref 0–0.2)
BASOPHILS NFR BLD: 1 % (ref 0–2)
BILIRUB SERPL-MCNC: 0.5 MG/DL (ref 0–1.2)
BILIRUBIN, URINE, POC: ABNORMAL
BLOOD URINE, POC: NEGATIVE
BUN SERPL-MCNC: 14 MG/DL (ref 6–23)
CALCIUM SERPL-MCNC: 9 MG/DL (ref 8.8–10.2)
CHLORIDE SERPL-SCNC: 102 MMOL/L (ref 98–107)
CHOLEST SERPL-MCNC: 206 MG/DL (ref 0–200)
CO2 SERPL-SCNC: 26 MMOL/L (ref 20–28)
CREAT SERPL-MCNC: 0.88 MG/DL (ref 0.6–1.1)
DIFFERENTIAL METHOD BLD: NORMAL
EOSINOPHIL # BLD: 0.4 K/UL (ref 0–0.8)
EOSINOPHIL NFR BLD: 4 % (ref 0.5–7.8)
ERYTHROCYTE [DISTWIDTH] IN BLOOD BY AUTOMATED COUNT: 12.1 % (ref 11.9–14.6)
GLOBULIN SER CALC-MCNC: 3.3 G/DL (ref 2.3–3.5)
GLUCOSE SERPL-MCNC: 94 MG/DL (ref 70–99)
GLUCOSE URINE, POC: NEGATIVE
HCT VFR BLD AUTO: 41.6 % (ref 35.8–46.3)
HDLC SERPL-MCNC: 76 MG/DL (ref 40–60)
HDLC SERPL: 2.7 (ref 0–5)
HGB BLD-MCNC: 13.4 G/DL (ref 11.7–15.4)
IMM GRANULOCYTES # BLD AUTO: 0 K/UL (ref 0–0.5)
IMM GRANULOCYTES NFR BLD AUTO: 0 % (ref 0–5)
KETONES, URINE, POC: ABNORMAL
LDLC SERPL CALC-MCNC: 113 MG/DL (ref 0–100)
LEUKOCYTE ESTERASE, URINE, POC: NEGATIVE
LYMPHOCYTES # BLD: 3.5 K/UL (ref 0.5–4.6)
LYMPHOCYTES NFR BLD: 42 % (ref 13–44)
MCH RBC QN AUTO: 31.1 PG (ref 26.1–32.9)
MCHC RBC AUTO-ENTMCNC: 32.2 G/DL (ref 31.4–35)
MCV RBC AUTO: 96.5 FL (ref 82–102)
MONOCYTES # BLD: 0.6 K/UL (ref 0.1–1.3)
MONOCYTES NFR BLD: 8 % (ref 4–12)
NEUTS SEG # BLD: 3.7 K/UL (ref 1.7–8.2)
NEUTS SEG NFR BLD: 45 % (ref 43–78)
NITRITE, URINE, POC: NEGATIVE
NRBC # BLD: 0 K/UL (ref 0–0.2)
PH, URINE, POC: 6 (ref 4.6–8)
PLATELET # BLD AUTO: 319 K/UL (ref 150–450)
PMV BLD AUTO: 9.7 FL (ref 9.4–12.3)
POTASSIUM SERPL-SCNC: 4.1 MMOL/L (ref 3.5–5.1)
PROT SERPL-MCNC: 6.3 G/DL (ref 6.3–8.2)
PROTEIN,URINE, POC: ABNORMAL
RBC # BLD AUTO: 4.31 M/UL (ref 4.05–5.2)
SODIUM SERPL-SCNC: 136 MMOL/L (ref 136–145)
SPECIFIC GRAVITY, URINE, POC: 1.02 (ref 1–1.03)
TRIGL SERPL-MCNC: 83 MG/DL (ref 0–150)
TSH W FREE THYROID IF ABNORMAL: 2.99 UIU/ML (ref 0.27–4.2)
URINALYSIS CLARITY, POC: ABNORMAL
URINALYSIS COLOR, POC: ABNORMAL
UROBILINOGEN, POC: NORMAL
VLDLC SERPL CALC-MCNC: 17 MG/DL (ref 6–23)
WBC # BLD AUTO: 8.2 K/UL (ref 4.3–11.1)

## 2024-07-08 PROCEDURE — 81002 URINALYSIS NONAUTO W/O SCOPE: CPT | Performed by: NURSE PRACTITIONER

## 2024-07-18 ENCOUNTER — OFFICE VISIT (OUTPATIENT)
Dept: FAMILY MEDICINE CLINIC | Facility: CLINIC | Age: 49
End: 2024-07-18
Payer: COMMERCIAL

## 2024-07-18 VITALS
HEART RATE: 61 BPM | BODY MASS INDEX: 35.75 KG/M2 | OXYGEN SATURATION: 100 % | RESPIRATION RATE: 16 BRPM | SYSTOLIC BLOOD PRESSURE: 120 MMHG | DIASTOLIC BLOOD PRESSURE: 68 MMHG | HEIGHT: 64 IN | TEMPERATURE: 98 F | WEIGHT: 209.4 LBS

## 2024-07-18 DIAGNOSIS — Z00.00 ROUTINE GENERAL MEDICAL EXAMINATION AT A HEALTH CARE FACILITY: Primary | ICD-10-CM

## 2024-07-18 DIAGNOSIS — E55.9 VITAMIN D DEFICIENCY: ICD-10-CM

## 2024-07-18 DIAGNOSIS — E78.00 PURE HYPERCHOLESTEROLEMIA: ICD-10-CM

## 2024-07-18 DIAGNOSIS — I10 ESSENTIAL HYPERTENSION: ICD-10-CM

## 2024-07-18 DIAGNOSIS — Z12.31 BREAST CANCER SCREENING BY MAMMOGRAM: ICD-10-CM

## 2024-07-18 LAB
BILIRUBIN, URINE, POC: NEGATIVE
BLOOD URINE, POC: NEGATIVE
GLUCOSE URINE, POC: NEGATIVE
KETONES, URINE, POC: NEGATIVE
LEUKOCYTE ESTERASE, URINE, POC: NEGATIVE
NITRITE, URINE, POC: NEGATIVE
PH, URINE, POC: 6.5 (ref 4.6–8)
PROTEIN,URINE, POC: NEGATIVE
SPECIFIC GRAVITY, URINE, POC: 1.01 (ref 1–1.03)
URINALYSIS CLARITY, POC: CLEAR
URINALYSIS COLOR, POC: YELLOW
UROBILINOGEN, POC: NORMAL

## 2024-07-18 PROCEDURE — 99396 PREV VISIT EST AGE 40-64: CPT | Performed by: NURSE PRACTITIONER

## 2024-07-18 PROCEDURE — 3078F DIAST BP <80 MM HG: CPT | Performed by: NURSE PRACTITIONER

## 2024-07-18 PROCEDURE — 81003 URINALYSIS AUTO W/O SCOPE: CPT | Performed by: NURSE PRACTITIONER

## 2024-07-18 PROCEDURE — 3074F SYST BP LT 130 MM HG: CPT | Performed by: NURSE PRACTITIONER

## 2024-07-18 SDOH — ECONOMIC STABILITY: FOOD INSECURITY: WITHIN THE PAST 12 MONTHS, YOU WORRIED THAT YOUR FOOD WOULD RUN OUT BEFORE YOU GOT MONEY TO BUY MORE.: NEVER TRUE

## 2024-07-18 SDOH — ECONOMIC STABILITY: INCOME INSECURITY: HOW HARD IS IT FOR YOU TO PAY FOR THE VERY BASICS LIKE FOOD, HOUSING, MEDICAL CARE, AND HEATING?: NOT HARD AT ALL

## 2024-07-18 SDOH — ECONOMIC STABILITY: FOOD INSECURITY: WITHIN THE PAST 12 MONTHS, THE FOOD YOU BOUGHT JUST DIDN'T LAST AND YOU DIDN'T HAVE MONEY TO GET MORE.: NEVER TRUE

## 2024-07-18 ASSESSMENT — ENCOUNTER SYMPTOMS
VOMITING: 0
GASTROINTESTINAL NEGATIVE: 1
NAUSEA: 0
RESPIRATORY NEGATIVE: 1
SORE THROAT: 0
EYE ITCHING: 0
VOICE CHANGE: 0
CHOKING: 0
WHEEZING: 0
RECTAL PAIN: 0
TROUBLE SWALLOWING: 0
SINUS PAIN: 0
ANAL BLEEDING: 0
EYE PAIN: 0
EYE DISCHARGE: 0
ALLERGIC/IMMUNOLOGIC NEGATIVE: 1
RHINORRHEA: 0
DIARRHEA: 0
ABDOMINAL PAIN: 0
PHOTOPHOBIA: 0
EYES NEGATIVE: 1
EYE REDNESS: 0
BACK PAIN: 0
COLOR CHANGE: 0
ABDOMINAL DISTENTION: 0
STRIDOR: 0
CONSTIPATION: 0
COUGH: 0
SINUS PRESSURE: 0
APNEA: 0
SHORTNESS OF BREATH: 0
CHEST TIGHTNESS: 0
FACIAL SWELLING: 0
BLOOD IN STOOL: 0

## 2024-07-18 ASSESSMENT — PATIENT HEALTH QUESTIONNAIRE - PHQ9
2. FEELING DOWN, DEPRESSED OR HOPELESS: NOT AT ALL
SUM OF ALL RESPONSES TO PHQ QUESTIONS 1-9: 0
SUM OF ALL RESPONSES TO PHQ QUESTIONS 1-9: 0
SUM OF ALL RESPONSES TO PHQ9 QUESTIONS 1 & 2: 0
SUM OF ALL RESPONSES TO PHQ QUESTIONS 1-9: 0
SUM OF ALL RESPONSES TO PHQ QUESTIONS 1-9: 0

## 2024-07-18 NOTE — PROGRESS NOTES
PROGRESS NOTE    Chief Complaint   Patient presents with    Annual Exam       SUBJECTIVE:     Celestina Joseph is a very pleasant 48 y.o. female with hx of  anxiety, GERD, hyperlipidemia, hypertension and migraine, seen today in office for her annual medical.  She reports doing quite well overall just frustrated about weight gain easily.  She has already started her weight watchers program and continues to walk daily for 25 to 30 minutes at lunch.  She also continues to stay quite busy taking care of her young grandkids after work running and chasing after them.  She reports her right wrist and hand has been feeling much better although occasionally does have some discomfort but usually moving and range of motion help provide relief.  Otherwise she has no other concerns and reports feeling quite well overall. Menstrual usually 4 days, LMP 6/29/24.  Was seeing Dr. Mary Avila but recently had to switch to another OB/GYN for establishment of care for her GYN health and she has an appointment on 8/18 for a consultation.    Past Medical History, Past Surgical History, Family history, Social History, and Medications were all reviewed with the patient today and updated as necessary.       Current Outpatient Medications   Medication Sig Dispense Refill    meloxicam (MOBIC) 7.5 MG tablet TAKE 1 TABLET BY MOUTH 2 TIMES DAILY AS NEEDED FOR PAIN TAKE WITH FOOD 60 tablet 0    atorvastatin (LIPITOR) 40 MG tablet Take 1 tablet by mouth every evening 90 tablet 3    busPIRone (BUSPAR) 5 MG tablet TAKE 1 TABLET BY MOUTH TWICE A DAY INDICATIONS: REPEATED EPISODES OF ANXIETY 180 tablet 3    pantoprazole (PROTONIX) 40 MG tablet TAKE 1 TABLET BY MOUTH EVERY DAY INDICATIONS: GASTROESOPHAGEAL REFLUX DISEASE 90 tablet 3    Bacillus Coagulans-Inulin (PROBIOTIC) 1-250 BILLION-MG CAPS Take by mouth      Omega-3 Fatty Acids (FISH OIL) 1000 MG capsule Take by mouth daily      tiZANidine (ZANAFLEX) 4 MG tablet Take 1/2 to 1 tablet orally

## 2024-08-16 NOTE — PROGRESS NOTES
2024    Celestina Joseph  1975      PCP: Ryann Cobb APRN - CNP  Patient does see them for regular preventative visits.      HPI: 49 y.o. year old  Here for annual gyn wellness exam.   New pt.   BC = pills.    Uses temovate for LS. Was only seeing Margarita once a yr for this.  She has itching before menses starts and uses it then. She is not using any type of pad when it flares. Uses bid, for 4-5d.   LS was bx proven.   Says she scratches in her sleep a lot.     Patient's last menstrual period was 2024 (exact date).    Social History     Socioeconomic History    Marital status:      Spouse name: None    Number of children: None    Years of education: None    Highest education level: None   Tobacco Use    Smoking status: Never    Smokeless tobacco: Never   Vaping Use    Vaping status: Never Used   Substance and Sexual Activity    Alcohol use: Yes     Alcohol/week: 1.0 standard drink of alcohol     Types: 1 Drinks containing 0.5 oz of alcohol per week     Comment: very rarely    Drug use: Never    Sexual activity: Yes     Partners: Male     Social Determinants of Health     Financial Resource Strain: Low Risk  (2024)    Overall Financial Resource Strain (CARDIA)     Difficulty of Paying Living Expenses: Not hard at all   Food Insecurity: No Food Insecurity (2024)    Hunger Vital Sign     Worried About Running Out of Food in the Last Year: Never true     Ran Out of Food in the Last Year: Never true   Transportation Needs: Unknown (2024)    PRAPARE - Transportation     Lack of Transportation (Non-Medical): No   Physical Activity: Insufficiently Active (2022)    Exercise Vital Sign     Days of Exercise per Week: 3 days     Minutes of Exercise per Session: 20 min   Stress: Stress Concern Present (2022)    [employer] [place] [employer] - Occupational Stress Questionnaire     Feeling of Stress : Rather much   Social Connections: Moderately Integrated

## 2024-08-19 ENCOUNTER — OFFICE VISIT (OUTPATIENT)
Dept: OBGYN CLINIC | Age: 49
End: 2024-08-19
Payer: COMMERCIAL

## 2024-08-19 VITALS
WEIGHT: 210 LBS | BODY MASS INDEX: 35.85 KG/M2 | DIASTOLIC BLOOD PRESSURE: 72 MMHG | SYSTOLIC BLOOD PRESSURE: 120 MMHG | HEIGHT: 64 IN

## 2024-08-19 DIAGNOSIS — Z30.41 ORAL CONTRACEPTIVE PILL SURVEILLANCE: ICD-10-CM

## 2024-08-19 DIAGNOSIS — L90.0 LICHEN SCLEROSUS: ICD-10-CM

## 2024-08-19 DIAGNOSIS — Z01.419 WELL WOMAN EXAM: Primary | ICD-10-CM

## 2024-08-19 DIAGNOSIS — Z12.31 ENCOUNTER FOR SCREENING MAMMOGRAM FOR MALIGNANT NEOPLASM OF BREAST: ICD-10-CM

## 2024-08-19 PROCEDURE — 3074F SYST BP LT 130 MM HG: CPT | Performed by: OBSTETRICS & GYNECOLOGY

## 2024-08-19 PROCEDURE — 3078F DIAST BP <80 MM HG: CPT | Performed by: OBSTETRICS & GYNECOLOGY

## 2024-08-19 PROCEDURE — 99459 PELVIC EXAMINATION: CPT | Performed by: OBSTETRICS & GYNECOLOGY

## 2024-08-19 PROCEDURE — 99386 PREV VISIT NEW AGE 40-64: CPT | Performed by: OBSTETRICS & GYNECOLOGY

## 2024-08-19 RX ORDER — CLOBETASOL PROPIONATE 0.5 MG/G
OINTMENT TOPICAL
Qty: 60 G | Refills: 12 | Status: SHIPPED | OUTPATIENT
Start: 2024-08-19

## 2024-08-19 RX ORDER — NORETHINDRONE AND ETHINYL ESTRADIOL 7 DAYS X 3
1 KIT ORAL NIGHTLY
Qty: 3 PACKET | Refills: 4 | Status: SHIPPED | OUTPATIENT
Start: 2024-08-19

## 2024-11-14 DIAGNOSIS — Z30.41 ORAL CONTRACEPTIVE PILL SURVEILLANCE: ICD-10-CM

## 2024-11-14 RX ORDER — NORETHINDRONE AND ETHINYL ESTRADIOL 7 DAYS X 3
1 KIT ORAL NIGHTLY
Qty: 3 PACKET | Refills: 4 | Status: SHIPPED | OUTPATIENT
Start: 2024-11-14

## 2024-11-14 RX ORDER — NORETHINDRONE AND ETHINYL ESTRADIOL 7 DAYS X 3
1 KIT ORAL NIGHTLY
Qty: 84 TABLET | Refills: 1 | OUTPATIENT
Start: 2024-11-14

## 2025-02-17 RX ORDER — ATORVASTATIN CALCIUM 40 MG/1
40 TABLET, FILM COATED ORAL EVERY EVENING
Qty: 90 TABLET | Refills: 3 | Status: SHIPPED | OUTPATIENT
Start: 2025-02-17

## 2025-02-17 RX ORDER — BUSPIRONE HYDROCHLORIDE 5 MG/1
TABLET ORAL
Qty: 180 TABLET | Refills: 3 | Status: SHIPPED | OUTPATIENT
Start: 2025-02-17

## 2025-02-17 RX ORDER — PANTOPRAZOLE SODIUM 40 MG/1
TABLET, DELAYED RELEASE ORAL
Qty: 90 TABLET | Refills: 3 | Status: SHIPPED | OUTPATIENT
Start: 2025-02-17

## 2025-07-11 DIAGNOSIS — Z00.00 LABORATORY EXAMINATION ORDERED AS PART OF A COMPLETE PHYSICAL EXAMINATION: Primary | ICD-10-CM

## 2025-07-11 DIAGNOSIS — E55.9 VITAMIN D DEFICIENCY: ICD-10-CM

## 2025-07-11 DIAGNOSIS — Z01.84 LACK OF IMMUNITY TO HEPATITIS B VIRUS DEMONSTRATED BY SEROLOGIC TEST: ICD-10-CM

## 2025-07-11 DIAGNOSIS — Z13.1 SCREENING FOR DIABETES MELLITUS: ICD-10-CM

## 2025-07-14 ENCOUNTER — LAB (OUTPATIENT)
Dept: FAMILY MEDICINE CLINIC | Facility: CLINIC | Age: 50
End: 2025-07-14
Payer: COMMERCIAL

## 2025-07-14 DIAGNOSIS — Z13.1 SCREENING FOR DIABETES MELLITUS: ICD-10-CM

## 2025-07-14 DIAGNOSIS — Z01.84 LACK OF IMMUNITY TO HEPATITIS B VIRUS DEMONSTRATED BY SEROLOGIC TEST: ICD-10-CM

## 2025-07-14 DIAGNOSIS — Z00.00 LABORATORY EXAMINATION ORDERED AS PART OF A COMPLETE PHYSICAL EXAMINATION: ICD-10-CM

## 2025-07-14 DIAGNOSIS — E55.9 VITAMIN D DEFICIENCY: ICD-10-CM

## 2025-07-14 LAB
25(OH)D3 SERPL-MCNC: 68.7 NG/ML (ref 30–100)
ALBUMIN SERPL-MCNC: 3.1 G/DL (ref 3.5–5)
ALBUMIN/GLOB SERPL: 0.9 (ref 1–1.9)
ALP SERPL-CCNC: 58 U/L (ref 35–104)
ALT SERPL-CCNC: 16 U/L (ref 8–45)
ANION GAP SERPL CALC-SCNC: 13 MMOL/L (ref 7–16)
AST SERPL-CCNC: 20 U/L (ref 15–37)
BASOPHILS # BLD: 0.05 K/UL (ref 0–0.2)
BASOPHILS NFR BLD: 0.6 % (ref 0–2)
BILIRUB SERPL-MCNC: 0.4 MG/DL (ref 0–1.2)
BILIRUBIN, URINE, POC: NEGATIVE
BLOOD URINE, POC: NEGATIVE
BUN SERPL-MCNC: 16 MG/DL (ref 6–23)
CALCIUM SERPL-MCNC: 9.2 MG/DL (ref 8.8–10.2)
CHLORIDE SERPL-SCNC: 104 MMOL/L (ref 98–107)
CHOLEST SERPL-MCNC: 201 MG/DL (ref 0–200)
CO2 SERPL-SCNC: 23 MMOL/L (ref 20–29)
CREAT SERPL-MCNC: 0.95 MG/DL (ref 0.6–1.1)
DIFFERENTIAL METHOD BLD: ABNORMAL
EOSINOPHIL # BLD: 0.37 K/UL (ref 0–0.8)
EOSINOPHIL NFR BLD: 4.3 % (ref 0.5–7.8)
ERYTHROCYTE [DISTWIDTH] IN BLOOD BY AUTOMATED COUNT: 13 % (ref 11.9–14.6)
EST. AVERAGE GLUCOSE BLD GHB EST-MCNC: 117 MG/DL
GLOBULIN SER CALC-MCNC: 3.3 G/DL (ref 2.3–3.5)
GLUCOSE SERPL-MCNC: 87 MG/DL (ref 70–99)
GLUCOSE URINE, POC: NEGATIVE
HBA1C MFR BLD: 5.7 % (ref 0–5.6)
HBV SURFACE AB SERPL IA-ACNC: <3.5 MIU/ML
HCT VFR BLD AUTO: 40.2 % (ref 35.8–46.3)
HDLC SERPL-MCNC: 71 MG/DL (ref 40–60)
HDLC SERPL: 2.8 (ref 0–5)
HGB BLD-MCNC: 13.3 G/DL (ref 11.7–15.4)
IMM GRANULOCYTES # BLD AUTO: 0.02 K/UL (ref 0–0.5)
IMM GRANULOCYTES NFR BLD AUTO: 0.2 % (ref 0–5)
KETONES, URINE, POC: NEGATIVE
LDLC SERPL CALC-MCNC: 111 MG/DL (ref 0–100)
LEUKOCYTE ESTERASE, URINE, POC: NEGATIVE
LYMPHOCYTES # BLD: 3.82 K/UL (ref 0.5–4.6)
LYMPHOCYTES NFR BLD: 44.8 % (ref 13–44)
MCH RBC QN AUTO: 31.9 PG (ref 26.1–32.9)
MCHC RBC AUTO-ENTMCNC: 33.1 G/DL (ref 31.4–35)
MCV RBC AUTO: 96.4 FL (ref 82–102)
MONOCYTES # BLD: 0.54 K/UL (ref 0.1–1.3)
MONOCYTES NFR BLD: 6.3 % (ref 4–12)
NEUTS SEG # BLD: 3.73 K/UL (ref 1.7–8.2)
NEUTS SEG NFR BLD: 43.8 % (ref 43–78)
NITRITE, URINE, POC: NEGATIVE
NRBC # BLD: 0 K/UL (ref 0–0.2)
PH, URINE, POC: 6.5 (ref 4.6–8)
PLATELET # BLD AUTO: 345 K/UL (ref 150–450)
PMV BLD AUTO: 9.7 FL (ref 9.4–12.3)
POTASSIUM SERPL-SCNC: 4.3 MMOL/L (ref 3.5–5.1)
PROT SERPL-MCNC: 6.4 G/DL (ref 6.3–8.2)
PROTEIN,URINE, POC: NEGATIVE
RBC # BLD AUTO: 4.17 M/UL (ref 4.05–5.2)
SODIUM SERPL-SCNC: 139 MMOL/L (ref 136–145)
SPECIFIC GRAVITY, URINE, POC: 1.01 (ref 1–1.03)
TRIGL SERPL-MCNC: 96 MG/DL (ref 0–150)
TSH W FREE THYROID IF ABNORMAL: 3.36 UIU/ML (ref 0.27–4.2)
URINALYSIS CLARITY, POC: CLEAR
URINALYSIS COLOR, POC: YELLOW
UROBILINOGEN, POC: NORMAL
VLDLC SERPL CALC-MCNC: 19 MG/DL (ref 6–23)
WBC # BLD AUTO: 8.5 K/UL (ref 4.3–11.1)

## 2025-07-14 PROCEDURE — 81003 URINALYSIS AUTO W/O SCOPE: CPT | Performed by: NURSE PRACTITIONER

## 2025-07-21 ASSESSMENT — PATIENT HEALTH QUESTIONNAIRE - PHQ9
SUM OF ALL RESPONSES TO PHQ9 QUESTIONS 1 & 2: 0
SUM OF ALL RESPONSES TO PHQ QUESTIONS 1-9: 0
SUM OF ALL RESPONSES TO PHQ QUESTIONS 1-9: 0
1. LITTLE INTEREST OR PLEASURE IN DOING THINGS: NOT AT ALL
2. FEELING DOWN, DEPRESSED OR HOPELESS: NOT AT ALL
SUM OF ALL RESPONSES TO PHQ QUESTIONS 1-9: 0
1. LITTLE INTEREST OR PLEASURE IN DOING THINGS: NOT AT ALL
2. FEELING DOWN, DEPRESSED OR HOPELESS: NOT AT ALL
SUM OF ALL RESPONSES TO PHQ QUESTIONS 1-9: 0

## 2025-07-22 ENCOUNTER — OFFICE VISIT (OUTPATIENT)
Dept: FAMILY MEDICINE CLINIC | Facility: CLINIC | Age: 50
End: 2025-07-22
Payer: COMMERCIAL

## 2025-07-22 VITALS
SYSTOLIC BLOOD PRESSURE: 118 MMHG | BODY MASS INDEX: 37.94 KG/M2 | DIASTOLIC BLOOD PRESSURE: 64 MMHG | HEIGHT: 64 IN | HEART RATE: 62 BPM | OXYGEN SATURATION: 96 % | WEIGHT: 222.2 LBS

## 2025-07-22 DIAGNOSIS — E66.812 CLASS 2 SEVERE OBESITY WITH SERIOUS COMORBIDITY AND BODY MASS INDEX (BMI) OF 38.0 TO 38.9 IN ADULT, UNSPECIFIED OBESITY TYPE (HCC): ICD-10-CM

## 2025-07-22 DIAGNOSIS — R06.83 SNORING: ICD-10-CM

## 2025-07-22 DIAGNOSIS — R40.0 DAYTIME SOMNOLENCE: ICD-10-CM

## 2025-07-22 DIAGNOSIS — Z00.00 ROUTINE GENERAL MEDICAL EXAMINATION AT A HEALTH CARE FACILITY: Primary | ICD-10-CM

## 2025-07-22 DIAGNOSIS — R29.818 SUSPECTED SLEEP APNEA: ICD-10-CM

## 2025-07-22 DIAGNOSIS — G25.81 RESTLESS LEG SYNDROME: ICD-10-CM

## 2025-07-22 DIAGNOSIS — E66.01 CLASS 2 SEVERE OBESITY WITH SERIOUS COMORBIDITY AND BODY MASS INDEX (BMI) OF 38.0 TO 38.9 IN ADULT, UNSPECIFIED OBESITY TYPE (HCC): ICD-10-CM

## 2025-07-22 DIAGNOSIS — E78.00 PURE HYPERCHOLESTEROLEMIA: ICD-10-CM

## 2025-07-22 DIAGNOSIS — I10 ESSENTIAL HYPERTENSION: ICD-10-CM

## 2025-07-22 DIAGNOSIS — M25.50 ARTHRALGIA, UNSPECIFIED JOINT: ICD-10-CM

## 2025-07-22 PROCEDURE — 3074F SYST BP LT 130 MM HG: CPT | Performed by: NURSE PRACTITIONER

## 2025-07-22 PROCEDURE — 3078F DIAST BP <80 MM HG: CPT | Performed by: NURSE PRACTITIONER

## 2025-07-22 PROCEDURE — 99396 PREV VISIT EST AGE 40-64: CPT | Performed by: NURSE PRACTITIONER

## 2025-07-22 PROCEDURE — 99214 OFFICE O/P EST MOD 30 MIN: CPT | Performed by: NURSE PRACTITIONER

## 2025-07-22 RX ORDER — ALBUTEROL SULFATE 90 UG/1
2 INHALANT RESPIRATORY (INHALATION) EVERY 4 HOURS PRN
Qty: 18 G | Refills: 5 | Status: SHIPPED | OUTPATIENT
Start: 2025-07-22

## 2025-07-22 RX ORDER — MELOXICAM 7.5 MG/1
7.5 TABLET ORAL 2 TIMES DAILY PRN
Qty: 60 TABLET | Refills: 0 | Status: SHIPPED | OUTPATIENT
Start: 2025-07-22

## 2025-07-22 SDOH — ECONOMIC STABILITY: FOOD INSECURITY: WITHIN THE PAST 12 MONTHS, YOU WORRIED THAT YOUR FOOD WOULD RUN OUT BEFORE YOU GOT MONEY TO BUY MORE.: NEVER TRUE

## 2025-07-22 SDOH — ECONOMIC STABILITY: FOOD INSECURITY: WITHIN THE PAST 12 MONTHS, THE FOOD YOU BOUGHT JUST DIDN'T LAST AND YOU DIDN'T HAVE MONEY TO GET MORE.: NEVER TRUE

## 2025-07-22 ASSESSMENT — ENCOUNTER SYMPTOMS
APNEA: 0
BLOOD IN STOOL: 0
VOMITING: 0
BACK PAIN: 0
VOICE CHANGE: 0
CHEST TIGHTNESS: 0
PHOTOPHOBIA: 0
CONSTIPATION: 0
EYES NEGATIVE: 1
EYE PAIN: 0
GASTROINTESTINAL NEGATIVE: 1
ABDOMINAL DISTENTION: 0
STRIDOR: 0
SINUS PRESSURE: 0
COUGH: 0
RESPIRATORY NEGATIVE: 1
RECTAL PAIN: 0
RHINORRHEA: 0
SINUS PAIN: 0
ALLERGIC/IMMUNOLOGIC NEGATIVE: 1
EYE REDNESS: 0
NAUSEA: 0
ANAL BLEEDING: 0
ABDOMINAL PAIN: 0
EYE DISCHARGE: 0
TROUBLE SWALLOWING: 0
CHOKING: 0
FACIAL SWELLING: 0
EYE ITCHING: 0
WHEEZING: 0
SHORTNESS OF BREATH: 0
COLOR CHANGE: 0
DIARRHEA: 0
SORE THROAT: 0

## 2025-07-22 NOTE — PROGRESS NOTES
FOR SHORTNESS OF BREATH/WHEEZING/COUGH Indications: asthma attack 18 g 5    hepatitis B vaccine (ENGERIX-B) 20 MCG/ML injection Inject 1 ml IM once for hep B vaccination, repeat second dose in 3 months. 1 mL 1    atorvastatin (LIPITOR) 40 MG tablet TAKE 1 TABLET BY MOUTH EVERY DAY IN THE EVENING 90 tablet 3    busPIRone (BUSPAR) 5 MG tablet TAKE 1 TABLET BY MOUTH TWICE A DAY INDICATIONS: REPEATED EPISODES OF ANXIETY 180 tablet 3    pantoprazole (PROTONIX) 40 MG tablet TAKE 1 TABLET BY MOUTH EVERY DAY INDICATIONS: GASTROESOPHAGEAL REFLUX DISEASE 90 tablet 3    norethindrone-ethinyl estradiol (NORTREL 7/7/7) 0.5/0.75/1-35 MG-MCG per tablet Take 1 tablet by mouth at bedtime TAKE 1 TABLET BY MOUTH EVERY DAY 3 packet 4    clobetasol (TEMOVATE) 0.05 % ointment Use up to 2 times a day with a flair or at least 2 times a month to decrease inflammation 60 g 12    Bacillus Coagulans-Inulin (PROBIOTIC) 1-250 BILLION-MG CAPS Take by mouth      Omega-3 Fatty Acids (FISH OIL) 1000 MG capsule Take by mouth daily      senna-docusate (PERICOLACE) 8.6-50 MG per tablet Take 2 tablets by mouth daily For stools 180 tablet 3    vitamin D 25 MCG (1000 UT) CAPS Take by mouth daily      SUMAtriptan (IMITREX) 50 MG tablet Take 1/2 to 1 tablet orally once daily as needed for headache       No current facility-administered medications for this visit.     No Known Allergies  Patient Active Problem List   Diagnosis    Hypertension    Lichen sclerosus et atrophicus of the vulva    Gastroesophageal reflux disease without esophagitis    Pure hypercholesterolemia    Iron deficiency anemia    Palpitations    Hyperlipidemia    Suspected sleep apnea    Snoring    Periodic limb movement disorder    Persistent disorder of initiating or maintaining sleep    Obesity (BMI 30.0-34.9)    Mild mitral regurgitation by prior echocardiogram     Past Medical History:   Diagnosis Date    Anemia 2017    Anxiety     Asthma     has prn inhaler- last use reported 7/2022

## 2025-08-21 ENCOUNTER — OFFICE VISIT (OUTPATIENT)
Dept: OBGYN CLINIC | Age: 50
End: 2025-08-21
Payer: COMMERCIAL

## 2025-08-21 VITALS
DIASTOLIC BLOOD PRESSURE: 88 MMHG | SYSTOLIC BLOOD PRESSURE: 140 MMHG | WEIGHT: 224 LBS | BODY MASS INDEX: 38.24 KG/M2 | HEIGHT: 64 IN

## 2025-08-21 DIAGNOSIS — Z11.51 SCREENING FOR HUMAN PAPILLOMAVIRUS (HPV): ICD-10-CM

## 2025-08-21 DIAGNOSIS — Z12.4 SCREENING FOR CERVICAL CANCER: ICD-10-CM

## 2025-08-21 DIAGNOSIS — L90.0 LICHEN SCLEROSUS: ICD-10-CM

## 2025-08-21 DIAGNOSIS — Z12.31 ENCOUNTER FOR SCREENING MAMMOGRAM FOR MALIGNANT NEOPLASM OF BREAST: ICD-10-CM

## 2025-08-21 DIAGNOSIS — Z01.419 WELL WOMAN EXAM: Primary | ICD-10-CM

## 2025-08-21 DIAGNOSIS — Z30.41 ORAL CONTRACEPTIVE PILL SURVEILLANCE: ICD-10-CM

## 2025-08-21 PROCEDURE — 3077F SYST BP >= 140 MM HG: CPT | Performed by: OBSTETRICS & GYNECOLOGY

## 2025-08-21 PROCEDURE — 3079F DIAST BP 80-89 MM HG: CPT | Performed by: OBSTETRICS & GYNECOLOGY

## 2025-08-21 PROCEDURE — 99459 PELVIC EXAMINATION: CPT | Performed by: OBSTETRICS & GYNECOLOGY

## 2025-08-21 PROCEDURE — 99396 PREV VISIT EST AGE 40-64: CPT | Performed by: OBSTETRICS & GYNECOLOGY

## 2025-08-21 RX ORDER — NORETHINDRONE AND ETHINYL ESTRADIOL 7 DAYS X 3
1 KIT ORAL NIGHTLY
Qty: 3 PACKET | Refills: 4 | Status: SHIPPED | OUTPATIENT
Start: 2025-08-21

## 2025-08-21 RX ORDER — CLOBETASOL PROPIONATE 0.5 MG/G
OINTMENT TOPICAL
Qty: 60 G | Refills: 12 | Status: SHIPPED | OUTPATIENT
Start: 2025-08-21

## 2025-08-25 LAB
COLLECTION METHOD: NORMAL
CYTOLOGIST CVX/VAG CYTO: NORMAL
CYTOLOGY CVX/VAG DOC THIN PREP: NORMAL
DATE OF LMP: NORMAL
HPV APTIMA: NEGATIVE
HPV GENOTYPE REFLEX: NORMAL
Lab: NORMAL
OTHER PT INFO: NORMAL
PAP SOURCE: NORMAL
PATH REPORT.FINAL DX SPEC: NORMAL
PREV CYTO INFO: NEGATIVE
PREV TREATMENT: NORMAL
STAT OF ADQ CVX/VAG CYTO-IMP: NORMAL

## (undated) DEVICE — CONNECTOR TBNG OD5-7MM O2 END DISP

## (undated) DEVICE — SINGLE PORT MANIFOLD: Brand: NEPTUNE 2

## (undated) DEVICE — SYRINGE MED 10ML LUERLOCK TIP W/O SFTY DISP

## (undated) DEVICE — SYRINGE MED 3ML CLR PLAS STD N CTRL LUERLOCK TIP DISP

## (undated) DEVICE — GAUZE,SPONGE,4"X4",12PLY,WOVEN,NS,LF: Brand: MEDLINE

## (undated) DEVICE — NEEDLE SYR 18GA L1.5IN RED PLAS HUB S STL BLNT FILL W/O

## (undated) DEVICE — LUBE JELLY FOIL PACK 1.4 OZ: Brand: MEDLINE INDUSTRIES, INC.

## (undated) DEVICE — AIRLIFE™ OXYGEN TUBING 7 FEET (2.1 M) CRUSH RESISTANT OXYGEN TUBING, VINYL TIPPED: Brand: AIRLIFE™

## (undated) DEVICE — SYRINGE, LUER SLIP, STERILE, 60ML: Brand: MEDLINE

## (undated) DEVICE — CANNULA NSL ORAL AD FOR CAPNOFLEX CO2 O2 AIRLFE

## (undated) DEVICE — YANKAUER,BULB TIP,W/O VENT,RIGID,STERILE: Brand: MEDLINE

## (undated) DEVICE — KENDALL RADIOLUCENT FOAM MONITORING ELECTRODE RECTANGULAR SHAPE: Brand: KENDALL